# Patient Record
Sex: FEMALE | Race: WHITE | NOT HISPANIC OR LATINO | URBAN - METROPOLITAN AREA
[De-identification: names, ages, dates, MRNs, and addresses within clinical notes are randomized per-mention and may not be internally consistent; named-entity substitution may affect disease eponyms.]

---

## 2023-01-01 ENCOUNTER — NURSING HOME VISIT (OUTPATIENT)
Dept: POST ACUTE CARE | Facility: EXTERNAL LOCATION | Age: 88
End: 2023-01-01
Payer: MEDICARE

## 2023-01-01 DIAGNOSIS — L89.150 PRESSURE ULCER OF COCCYGEAL REGION, UNSTAGEABLE (MULTI): Primary | ICD-10-CM

## 2023-01-01 DIAGNOSIS — J44.9 CHRONIC OBSTRUCTIVE PULMONARY DISEASE, UNSPECIFIED COPD TYPE (MULTI): ICD-10-CM

## 2023-01-01 DIAGNOSIS — J96.10 CHRONIC RESPIRATORY FAILURE, UNSPECIFIED WHETHER WITH HYPOXIA OR HYPERCAPNIA (MULTI): ICD-10-CM

## 2023-01-01 DIAGNOSIS — I10 HYPERTENSION, ESSENTIAL: ICD-10-CM

## 2023-01-01 DIAGNOSIS — I50.9 CHRONIC HEART FAILURE, UNSPECIFIED HEART FAILURE TYPE (MULTI): ICD-10-CM

## 2023-01-01 DIAGNOSIS — F41.9 ANXIETY: ICD-10-CM

## 2023-01-01 DIAGNOSIS — I50.32 CHRONIC DIASTOLIC HEART FAILURE (MULTI): ICD-10-CM

## 2023-01-01 DIAGNOSIS — I50.9 CHRONIC HEART FAILURE, UNSPECIFIED HEART FAILURE TYPE (MULTI): Primary | ICD-10-CM

## 2023-01-01 DIAGNOSIS — R12 HEARTBURN: ICD-10-CM

## 2023-01-01 DIAGNOSIS — J96.21 ACUTE ON CHRONIC RESPIRATORY FAILURE WITH HYPOXIA AND HYPERCAPNIA (MULTI): ICD-10-CM

## 2023-01-01 DIAGNOSIS — J18.9 PNEUMONIA DUE TO INFECTIOUS ORGANISM, UNSPECIFIED LATERALITY, UNSPECIFIED PART OF LUNG: ICD-10-CM

## 2023-01-01 DIAGNOSIS — R00.0 TACHYCARDIA: ICD-10-CM

## 2023-01-01 DIAGNOSIS — J96.22 ACUTE ON CHRONIC RESPIRATORY FAILURE WITH HYPOXIA AND HYPERCAPNIA (MULTI): Primary | ICD-10-CM

## 2023-01-01 DIAGNOSIS — R53.1 WEAKNESS: ICD-10-CM

## 2023-01-01 DIAGNOSIS — J90 PLEURAL EFFUSION: ICD-10-CM

## 2023-01-01 DIAGNOSIS — J96.21 ACUTE ON CHRONIC RESPIRATORY FAILURE WITH HYPOXIA AND HYPERCAPNIA (MULTI): Primary | ICD-10-CM

## 2023-01-01 DIAGNOSIS — R53.83 LETHARGY: Primary | ICD-10-CM

## 2023-01-01 DIAGNOSIS — M79.605 PAIN IN BOTH LOWER EXTREMITIES: Primary | ICD-10-CM

## 2023-01-01 DIAGNOSIS — J18.9 PNEUMONIA DUE TO INFECTIOUS ORGANISM, UNSPECIFIED LATERALITY, UNSPECIFIED PART OF LUNG: Primary | ICD-10-CM

## 2023-01-01 DIAGNOSIS — R06.2 WHEEZING: ICD-10-CM

## 2023-01-01 DIAGNOSIS — L89.150 PRESSURE ULCER OF COCCYGEAL REGION, UNSTAGEABLE (MULTI): ICD-10-CM

## 2023-01-01 DIAGNOSIS — M79.604 PAIN IN BOTH LOWER EXTREMITIES: Primary | ICD-10-CM

## 2023-01-01 DIAGNOSIS — J96.22 ACUTE ON CHRONIC RESPIRATORY FAILURE WITH HYPOXIA AND HYPERCAPNIA (MULTI): ICD-10-CM

## 2023-01-01 LAB
ANION GAP IN SER/PLAS: 16 MMOL/L (ref 10–20)
BASOPHILS (10*3/UL) IN BLOOD BY AUTOMATED COUNT: 0.02 X10E9/L (ref 0–0.1)
BASOPHILS/100 LEUKOCYTES IN BLOOD BY AUTOMATED COUNT: 0.2 % (ref 0–2)
CALCIUM (MG/DL) IN SER/PLAS: 8.3 MG/DL (ref 8.6–10.3)
CARBON DIOXIDE, TOTAL (MMOL/L) IN SER/PLAS: 26 MMOL/L (ref 21–32)
CHLORIDE (MMOL/L) IN SER/PLAS: 95 MMOL/L (ref 98–107)
CREATININE (MG/DL) IN SER/PLAS: 1.05 MG/DL (ref 0.5–1.05)
ERYTHROCYTE DISTRIBUTION WIDTH (RATIO) BY AUTOMATED COUNT: 16 % (ref 11.5–14.5)
ERYTHROCYTE MEAN CORPUSCULAR HEMOGLOBIN CONCENTRATION (G/DL) BY AUTOMATED: 29.6 G/DL (ref 32–36)
ERYTHROCYTE MEAN CORPUSCULAR VOLUME (FL) BY AUTOMATED COUNT: 87 FL (ref 80–100)
ERYTHROCYTES (10*6/UL) IN BLOOD BY AUTOMATED COUNT: 2.87 X10E12/L (ref 4–5.2)
GFR FEMALE: 47 ML/MIN/1.73M2
GLUCOSE (MG/DL) IN SER/PLAS: 105 MG/DL (ref 74–99)
HEMATOCRIT (%) IN BLOOD BY AUTOMATED COUNT: 25 % (ref 36–46)
HEMOGLOBIN (G/DL) IN BLOOD: 7.4 G/DL (ref 12–16)
IMMATURE GRANULOCYTES/100 LEUKOCYTES IN BLOOD BY AUTOMATED COUNT: 0.5 % (ref 0–0.9)
LEUKOCYTES (10*3/UL) IN BLOOD BY AUTOMATED COUNT: 10 X10E9/L (ref 4.4–11.3)
LYMPHOCYTES (10*3/UL) IN BLOOD BY AUTOMATED COUNT: 0.56 X10E9/L (ref 0.8–3)
LYMPHOCYTES/100 LEUKOCYTES IN BLOOD BY AUTOMATED COUNT: 5.6 % (ref 13–44)
MONOCYTES (10*3/UL) IN BLOOD BY AUTOMATED COUNT: 0.42 X10E9/L (ref 0.05–0.8)
MONOCYTES/100 LEUKOCYTES IN BLOOD BY AUTOMATED COUNT: 4.2 % (ref 2–10)
NEUTROPHILS (10*3/UL) IN BLOOD BY AUTOMATED COUNT: 8.91 X10E9/L (ref 1.6–5.5)
NEUTROPHILS/100 LEUKOCYTES IN BLOOD BY AUTOMATED COUNT: 89.5 % (ref 40–80)
PLATELETS (10*3/UL) IN BLOOD AUTOMATED COUNT: 332 X10E9/L (ref 150–450)
POTASSIUM (MMOL/L) IN SER/PLAS: 4.4 MMOL/L (ref 3.5–5.3)
SODIUM (MMOL/L) IN SER/PLAS: 133 MMOL/L (ref 136–145)
UREA NITROGEN (MG/DL) IN SER/PLAS: 27 MG/DL (ref 6–23)

## 2023-01-01 PROCEDURE — 99308 SBSQ NF CARE LOW MDM 20: CPT | Performed by: INTERNAL MEDICINE

## 2023-01-01 PROCEDURE — 99309 SBSQ NF CARE MODERATE MDM 30: CPT | Performed by: NURSE PRACTITIONER

## 2023-01-01 PROCEDURE — 99305 1ST NF CARE MODERATE MDM 35: CPT | Performed by: INTERNAL MEDICINE

## 2023-01-01 PROCEDURE — 99306 1ST NF CARE HIGH MDM 50: CPT | Performed by: INTERNAL MEDICINE

## 2023-01-01 PROCEDURE — 11042 DBRDMT SUBQ TIS 1ST 20SQCM/<: CPT | Performed by: NURSE PRACTITIONER

## 2023-01-01 PROCEDURE — 99309 SBSQ NF CARE MODERATE MDM 30: CPT | Performed by: INTERNAL MEDICINE

## 2023-01-01 PROCEDURE — 99306 1ST NF CARE HIGH MDM 50: CPT | Performed by: NURSE PRACTITIONER

## 2023-01-01 PROCEDURE — 99308 SBSQ NF CARE LOW MDM 20: CPT | Performed by: NURSE PRACTITIONER

## 2023-03-23 PROBLEM — J96.10 CHRONIC RESPIRATORY FAILURE (MULTI): Status: ACTIVE | Noted: 2023-01-01

## 2023-03-23 PROBLEM — I10 HYPERTENSION, ESSENTIAL: Status: ACTIVE | Noted: 2023-01-01

## 2023-03-23 PROBLEM — I50.9 CHRONIC HEART FAILURE (MULTI): Status: ACTIVE | Noted: 2023-01-01

## 2023-03-23 PROBLEM — J44.9 COPD (CHRONIC OBSTRUCTIVE PULMONARY DISEASE) (MULTI): Status: ACTIVE | Noted: 2023-01-01

## 2023-03-23 PROBLEM — R53.1 WEAKNESS: Status: ACTIVE | Noted: 2023-01-01

## 2023-03-23 NOTE — LETTER
Patient: Cindy Nova  : 1922    Encounter Date: 2023    History and Physical  Subjective  Chief complaint: Cindy Nova is a 89 y.o. female who is a long term resident patient being seen and evaluated for multiple medical problems.  Patient presents for general medical care and to establish care.    HPI:  Patient was admitted to  from home d/t need for assist with care.  Patient requires assist with ADLs.  She is on O2 3L at baseline d/t hx CHF and COPD .  BP controlled on current medication.    Patient denies pain n/v/f/c.        Past Medical History:   Diagnosis Date   • Anemia    • Anxiety    • Chronic heart failure (CMS/HCC)    • Chronic respiratory failure (CMS/HCC)    • COPD (chronic obstructive pulmonary disease) (CMS/HCC)    • Hypertension, essential    • Other allergy status, other than to drugs and biological substances     History of environmental allergies   • Personal history of other diseases of the circulatory system     History of hypertension   • Personal history of other diseases of the circulatory system     History of atrial fibrillation   • Personal history of peptic ulcer disease 03/10/2015    Personal history of gastric ulcer   • Polyneuropathy    • Primary pulmonary HTN (CMS/HCC)        Past Surgical History:   Procedure Laterality Date   • CATARACT EXTRACTION  2014    Cataract Surgery   • TOTAL ABDOMINAL HYSTERECTOMY  2014    Total Abdominal Hysterectomy       Family History   Problem Relation Name Age of Onset   • No Known Problems Mother     • No Known Problems Father         Social History     Socioeconomic History   • Marital status:      Spouse name: Not on file   • Number of children: Not on file   • Years of education: Not on file   • Highest education level: Not on file   Occupational History   • Not on file   Tobacco Use   • Smoking status: Not on file   • Smokeless tobacco: Not on file   Vaping Use   • Vaping status: Not on file    Substance and Sexual Activity   • Alcohol use: Not on file   • Drug use: Not on file   • Sexual activity: Not on file   Other Topics Concern   • Not on file   Social History Narrative   • Not on file     Social Determinants of Health     Financial Resource Strain: Not on file   Food Insecurity: Not on file   Transportation Needs: Not on file   Physical Activity: Not on file   Stress: Not on file   Social Connections: Not on file   Intimate Partner Violence: Not on file   Housing Stability: Not on file       Vital signs: 137/63, 98.0, 96, 18, 98%    Objective  Physical Exam  Constitutional:       General: She is not in acute distress.  Eyes:      Extraocular Movements: Extraocular movements intact.   Cardiovascular:      Rate and Rhythm: Regular rhythm.   Pulmonary:      Effort: Pulmonary effort is normal.      Breath sounds: Normal breath sounds.      Comments: O2  Abdominal:      General: Bowel sounds are normal.      Palpations: Abdomen is soft.   Musculoskeletal:      Cervical back: Neck supple.      Right lower leg: No edema.      Left lower leg: No edema.      Comments: Generalized weakness   Neurological:      Mental Status: She is alert.   Psychiatric:         Mood and Affect: Mood normal.         Behavior: Behavior is cooperative.         Assessment/Plan  Problem List Items Addressed This Visit       Chronic heart failure (CMS/HCC) - Primary     Stable  No edema  Monitor weight         Chronic respiratory failure (CMS/HCC)     Continue home O2          COPD (chronic obstructive pulmonary disease) (CMS/HCC)     Continue inhalers  O2         Hypertension, essential     Controlled  Continue antihypertensives  Monitor blood pressure         Weakness     Medications, treatments, and labs reviewed  Continue medications and treatments as listed in [facility]    CAROLE Mendoza      Electronically Signed By: CAROLE Mendoza   3/23/23  8:01 PM

## 2023-03-23 NOTE — PROGRESS NOTES
History and Physical  Subjective   Chief complaint: Cindy Nova is a 89 y.o. female who is a long term resident patient being seen and evaluated for multiple medical problems.  Patient presents for general medical care and to establish care.    HPI:  Patient was admitted to  from home d/t need for assist with care.  Patient requires assist with ADLs.  She is on O2 3L at baseline d/t hx CHF and COPD .  BP controlled on current medication.    Patient denies pain n/v/f/c.        Past Medical History:   Diagnosis Date    Anemia     Anxiety     Chronic heart failure (CMS/HCC)     Chronic respiratory failure (CMS/HCC)     COPD (chronic obstructive pulmonary disease) (CMS/[facility])     Hypertension, essential     Other allergy status, other than to drugs and biological substances     History of environmental allergies    Personal history of other diseases of the circulatory system     History of hypertension    Personal history of other diseases of the circulatory system     History of atrial fibrillation    Personal history of peptic ulcer disease 03/10/2015    Personal history of gastric ulcer    Polyneuropathy     Primary pulmonary HTN (CMS/[facility])        Past Surgical History:   Procedure Laterality Date    CATARACT EXTRACTION  11/13/2014    Cataract Surgery    TOTAL ABDOMINAL HYSTERECTOMY  11/13/2014    Total Abdominal Hysterectomy       Family History   Problem Relation Name Age of Onset    No Known Problems Mother      No Known Problems Father         Social History     Socioeconomic History    Marital status:      Spouse name: Not on file    Number of children: Not on file    Years of education: Not on file    Highest education level: Not on file   Occupational History    Not on file   Tobacco Use    Smoking status: Not on file    Smokeless tobacco: Not on file   Vaping Use    Vaping status: Not on file   Substance and Sexual Activity    Alcohol use: Not on file    Drug use: Not on file    Sexual activity:  Not on file   Other Topics Concern    Not on file   Social History Narrative    Not on file     Social Determinants of Health     Financial Resource Strain: Not on file   Food Insecurity: Not on file   Transportation Needs: Not on file   Physical Activity: Not on file   Stress: Not on file   Social Connections: Not on file   Intimate Partner Violence: Not on file   Housing Stability: Not on file       Vital signs: 137/63, 98.0, 96, 18, 98%    Objective   Physical Exam  Constitutional:       General: She is not in acute distress.  Eyes:      Extraocular Movements: Extraocular movements intact.   Cardiovascular:      Rate and Rhythm: Regular rhythm.   Pulmonary:      Effort: Pulmonary effort is normal.      Breath sounds: Normal breath sounds.      Comments: O2  Abdominal:      General: Bowel sounds are normal.      Palpations: Abdomen is soft.   Musculoskeletal:      Cervical back: Neck supple.      Right lower leg: No edema.      Left lower leg: No edema.      Comments: Generalized weakness   Neurological:      Mental Status: She is alert.   Psychiatric:         Mood and Affect: Mood normal.         Behavior: Behavior is cooperative.         Assessment/Plan   Problem List Items Addressed This Visit       Chronic heart failure (CMS/HCC) - Primary     Stable  No edema  Monitor weight         Chronic respiratory failure (CMS/HCC)     Continue home O2          COPD (chronic obstructive pulmonary disease) (CMS/HCC)     Continue inhalers  O2         Hypertension, essential     Controlled  Continue antihypertensives  Monitor blood pressure         Weakness     Medications, treatments, and labs reviewed  Continue medications and treatments as listed in [facility]    MIGDALIA Mendoza-CNP

## 2023-03-24 PROBLEM — R06.2 WHEEZING: Status: ACTIVE | Noted: 2023-01-01

## 2023-03-24 NOTE — LETTER
Patient: Cindy Nova  : 1922    Encounter Date: 2023    Progress Note  Subjective  Chief complaint: Cindy Nova is a 89 y.o. female who is a long term resident patient being seen and evaluated for general medical care and follow up, SOB and wheezing    HPI:  Patient recently  admitted to nursing facility  from home d/t need for assist with care.  Patient requires assist with ADLs.  Patient requires supplemental O2 at baseline due to diagnosis of COPD and CHF. Patient presently presents with complaints of  SOB and audible.  She denies cough or congestion.  She is pleasantly confused and has no other concerns today.       Objective  Vital signs: 124/74, 68, 18, 96%    Physical Exam  Constitutional:       General: She is not in acute distress.  Eyes:      Extraocular Movements: Extraocular movements intact.   Cardiovascular:      Rate and Rhythm: Regular rhythm.   Pulmonary:      Effort: Pulmonary effort is normal.      Breath sounds: Examination of the right-upper field reveals wheezing. Examination of the left-upper field reveals wheezing. Wheezing present.   Abdominal:      General: Bowel sounds are normal.      Palpations: Abdomen is soft.   Musculoskeletal:      Cervical back: Neck supple.      Right lower leg: No edema.      Left lower leg: No edema.   Neurological:      Mental Status: She is alert.   Psychiatric:         Mood and Affect: Mood normal.         Behavior: Behavior is cooperative.         Assessment/Plan  Problem List Items Addressed This Visit          Respiratory    COPD (chronic obstructive pulmonary disease) (CMS/HCC)     Continue inhalers  O2         Wheezing     + SOB  Start Duoneb aerosol Treatment TID x 3 days then continue PRN TID            Circulatory    Chronic heart failure (CMS/HCC)     Stable  No edema  Monitor weight          Medications, treatments, and labs reviewed  Continue medications and treatments as listed in [facility]    Amy Heath,  MD      Electronically Signed By: Amy Heath MD   3/25/23 10:52 AM

## 2023-03-24 NOTE — PROGRESS NOTES
Progress Note  Subjective   Chief complaint: Cindy Nova is a 89 y.o. female who is a long term resident patient being seen and evaluated for general medical care and follow up, SOB and wheezing    HPI:  Patient recently  admitted to nursing facility  from home d/t need for assist with care.  Patient requires assist with ADLs.  Patient requires supplemental O2 at baseline due to diagnosis of COPD and CHF. Patient presently presents with complaints of  SOB and audible.  She denies cough or congestion.  She is pleasantly confused and has no other concerns today.       Objective   Vital signs: 124/74, 68, 18, 96%    Physical Exam  Constitutional:       General: She is not in acute distress.  Eyes:      Extraocular Movements: Extraocular movements intact.   Cardiovascular:      Rate and Rhythm: Regular rhythm.   Pulmonary:      Effort: Pulmonary effort is normal.      Breath sounds: Examination of the right-upper field reveals wheezing. Examination of the left-upper field reveals wheezing. Wheezing present.   Abdominal:      General: Bowel sounds are normal.      Palpations: Abdomen is soft.   Musculoskeletal:      Cervical back: Neck supple.      Right lower leg: No edema.      Left lower leg: No edema.   Neurological:      Mental Status: She is alert.   Psychiatric:         Mood and Affect: Mood normal.         Behavior: Behavior is cooperative.         Assessment/Plan   Problem List Items Addressed This Visit          Respiratory    COPD (chronic obstructive pulmonary disease) (CMS/HCC)     Continue inhalers  O2         Wheezing     + SOB  Start Duoneb aerosol Treatment TID x 3 days then continue PRN TID            Circulatory    Chronic heart failure (CMS/HCC)     Stable  No edema  Monitor weight          Medications, treatments, and labs reviewed  Continue medications and treatments as listed in [facility]    Amy Heath MD

## 2023-03-29 NOTE — LETTER
Patient: Cindy Nova  : 1922    Encounter Date: 2023    Subjective  Chief complaint: Cindy Nova is a 89 y.o. female who is a long term resident patient being seen and evaluated for anxiety, heartburn    HPI:  Patient recently started on Xanax due to extreme agitation and anxiousness.  In addition patient had complaints of heartburn.  Denies stomach pain, nausea or vomiting.  No acute distress or further concerns today.        Review of Systems  All systems reviewed and negative except for what was mentioned in the HPI    Vital signs: 137/63, 96, 18, 96%    Objective  Physical Exam  Constitutional:       General: She is not in acute distress.  Eyes:      Extraocular Movements: Extraocular movements intact.   Cardiovascular:      Rate and Rhythm: Regular rhythm.   Pulmonary:      Effort: Pulmonary effort is normal.      Breath sounds: Normal breath sounds.   Abdominal:      General: Bowel sounds are normal.      Palpations: Abdomen is soft.   Musculoskeletal:      Cervical back: Neck supple.      Right lower leg: No edema.      Left lower leg: No edema.   Neurological:      Mental Status: She is alert.   Psychiatric:         Mood and Affect: Mood normal.         Behavior: Behavior is cooperative.         Assessment/Plan  Problem List Items Addressed This Visit          Digestive    Heartburn     Maalox PRN            Other    Anxiety     Continue Xanax          Medications, treatments, and labs reviewed  Continue medications and treatments as listed in [facility]    Scribe Attestation  By signing my name below, IDelicia Scribe   attest that this documentation has been prepared under the direction and in the presence of Amy Heath MD.    Provider Attestation - Scribe documentation  All medical record entries made by the Scribe were at my direction and personally dictated by me. I have reviewed the chart and agree that the record accurately reflects my personal performance of the  history, physical exam, discussion and plan.      Electronically Signed By: Amy Heath MD   3/30/23  6:02 PM

## 2023-03-30 PROBLEM — R12 HEARTBURN: Status: ACTIVE | Noted: 2023-01-01

## 2023-03-30 PROBLEM — F41.9 ANXIETY: Status: ACTIVE | Noted: 2023-01-01

## 2023-03-30 NOTE — PROGRESS NOTES
Subjective   Chief complaint: Cindy Nova is a 89 y.o. female who is a long term resident patient being seen and evaluated for anxiety, heartburn    HPI:  Patient recently started on Xanax due to extreme agitation and anxiousness.  In addition patient had complaints of heartburn.  Denies stomach pain, nausea or vomiting.  No acute distress or further concerns today.        Review of Systems  All systems reviewed and negative except for what was mentioned in the HPI    Vital signs: 137/63, 96, 18, 96%    Objective   Physical Exam  Constitutional:       General: She is not in acute distress.  Eyes:      Extraocular Movements: Extraocular movements intact.   Cardiovascular:      Rate and Rhythm: Regular rhythm.   Pulmonary:      Effort: Pulmonary effort is normal.      Breath sounds: Normal breath sounds.   Abdominal:      General: Bowel sounds are normal.      Palpations: Abdomen is soft.   Musculoskeletal:      Cervical back: Neck supple.      Right lower leg: No edema.      Left lower leg: No edema.   Neurological:      Mental Status: She is alert.   Psychiatric:         Mood and Affect: Mood normal.         Behavior: Behavior is cooperative.         Assessment/Plan   Problem List Items Addressed This Visit          Digestive    Heartburn     Maalox PRN            Other    Anxiety     Continue Xanax          Medications, treatments, and labs reviewed  Continue medications and treatments as listed in [facility]    Scribe Attestation  By signing my name below, IDelicia Scribe   attest that this documentation has been prepared under the direction and in the presence of Amy Heath MD.    Provider Attestation - Scribe documentation  All medical record entries made by the Scribe were at my direction and personally dictated by me. I have reviewed the chart and agree that the record accurately reflects my personal performance of the history, physical exam, discussion and plan.

## 2023-03-31 NOTE — LETTER
Patient: Cindy Nova  : 1922    Encounter Date: 2023    Subjective  Chief complaint: Cindy Nova is a 89 y.o. female who is a long term resident patient being seen and evaluated for low pulse Ox, dizziness    HPI:  Nurse called and reported patient with complaints of feeling dizzy and weak. Upon exam she was found to have low pulse Ox and was having complaints of shortness of  breath as well. No fever or chills noted on exam. CXR ordered Xanax and Metoprolol decreased and administration time of Gabapentin was changed to HS. XR results showed infiltrates. No other issues at this time.        Review of Systems  All systems reviewed and negative except for what was mentioned in the HPI    Vital signs: 145/80, 95, 18, 95%    Objective  Physical Exam  Constitutional:       General: She is not in acute distress.  Eyes:      Extraocular Movements: Extraocular movements intact.   Cardiovascular:      Rate and Rhythm: Regular rhythm.   Pulmonary:      Effort: Pulmonary effort is normal.      Breath sounds: Normal breath sounds.   Abdominal:      General: Bowel sounds are normal.      Palpations: Abdomen is soft.   Musculoskeletal:      Cervical back: Neck supple.      Right lower leg: No edema.      Left lower leg: No edema.   Neurological:      Mental Status: She is alert.   Psychiatric:         Mood and Affect: Mood normal.         Behavior: Behavior is cooperative.         Assessment/Plan  Problem List Items Addressed This Visit          Respiratory    COPD (chronic obstructive pulmonary disease) (CMS/[facility])     Continue inhalers  O2         Pneumonia due to infectious organism     Start Augmentin  Prednisone taper  O2  Inhalers  Aerosol tx          Medications, treatments, and labs reviewed  Continue medications and treatments as listed in [facility]    Scribe Attestation  By signing my name below, I, Delicia Son, Scribe   attest that this documentation has been prepared under the direction and in the  presence of Amy Heath MD.    Provider Attestation - Scribe documentation  All medical record entries made by the Scribe were at my direction and personally dictated by me. I have reviewed the chart and agree that the record accurately reflects my personal performance of the history, physical exam, discussion and plan.      Electronically Signed By: Amy Heath MD   4/3/23  6:25 PM

## 2023-04-03 PROBLEM — J18.9 PNEUMONIA DUE TO INFECTIOUS ORGANISM: Status: ACTIVE | Noted: 2023-01-01

## 2023-04-03 NOTE — PROGRESS NOTES
Subjective   Chief complaint: Cindy Nova is a 89 y.o. female who is a long term resident patient being seen and evaluated for low pulse Ox, dizziness    HPI:  Nurse called and reported patient with complaints of feeling dizzy and weak. Upon exam she was found to have low pulse Ox and was having complaints of shortness of  breath as well. No fever or chills noted on exam. CXR ordered Xanax and Metoprolol decreased and administration time of Gabapentin was changed to HS. XR results showed infiltrates. No other issues at this time.        Review of Systems  All systems reviewed and negative except for what was mentioned in the HPI    Vital signs: 145/80, 95, 18, 95%    Objective   Physical Exam  Constitutional:       General: She is not in acute distress.  Eyes:      Extraocular Movements: Extraocular movements intact.   Cardiovascular:      Rate and Rhythm: Regular rhythm.   Pulmonary:      Effort: Pulmonary effort is normal.      Breath sounds: Normal breath sounds.   Abdominal:      General: Bowel sounds are normal.      Palpations: Abdomen is soft.   Musculoskeletal:      Cervical back: Neck supple.      Right lower leg: No edema.      Left lower leg: No edema.   Neurological:      Mental Status: She is alert.   Psychiatric:         Mood and Affect: Mood normal.         Behavior: Behavior is cooperative.         Assessment/Plan   Problem List Items Addressed This Visit          Respiratory    COPD (chronic obstructive pulmonary disease) (CMS/[facility])     Continue inhalers  O2         Pneumonia due to infectious organism     Start Augmentin  Prednisone taper  O2  Inhalers  Aerosol tx          Medications, treatments, and labs reviewed  Continue medications and treatments as listed in [facility]    Scribe Attestation  By signing my name below, Delicia SANTO, Elsaibkira   attest that this documentation has been prepared under the direction and in the presence of Amy Heath MD.    Provider Attestation - Scribe  documentation  All medical record entries made by the Scribe were at my direction and personally dictated by me. I have reviewed the chart and agree that the record accurately reflects my personal performance of the history, physical exam, discussion and plan.

## 2023-04-04 NOTE — LETTER
Patient: Cindy Nova  : 1922    Encounter Date: 2023    PROGRESS NOTE    Subjective  Chief complaint: Cindy Nova is a 89 y.o. female who is a long term care patient being seen and evaluated for anxiety and fu pna.    HPI:  Patient remains on atb for pna.  She denies sob f/c.  Nurse reporting patient with frequent episodes of anxiety which are intermittent throughout the day, occur daily, and have been present upon admission.        Objective  Physical Exam  Constitutional:       General: She is not in acute distress.  Eyes:      Extraocular Movements: Extraocular movements intact.   Cardiovascular:      Rate and Rhythm: Regular rhythm.   Pulmonary:      Effort: Pulmonary effort is normal.      Breath sounds: Normal breath sounds.      Comments: O2  Abdominal:      General: Bowel sounds are normal.      Palpations: Abdomen is soft.   Musculoskeletal:      Cervical back: Neck supple.      Right lower leg: No edema.      Left lower leg: No edema.      Comments: Generalized weakness   Neurological:      Mental Status: She is alert.   Psychiatric:         Behavior: Behavior is cooperative.         Assessment/Plan  Problem List Items Addressed This Visit       Anxiety     Start buspar         Chronic heart failure (CMS/HCC)     Stable  No edema  Monitor weight         Chronic respiratory failure (CMS/HCC)     Continue home O2          COPD (chronic obstructive pulmonary disease) (CMS/HCC)     Continue inhalers  O2         Hypertension, essential     Continue antihypertensives  Monitor blood pressure         Pneumonia due to infectious organism - Primary     Continue atb until complete          Medications, treatments, and labs reviewed  Continue medications and treatments as listed in [facility]    CAROLE Mendoza      Electronically Signed By: CAROLE Mendoza   23  5:57 PM   [Well Developed] : well developed [Well Nourished] : well nourished

## 2023-04-05 NOTE — PROGRESS NOTES
PROGRESS NOTE    Subjective   Chief complaint: Cindy Nova is a 89 y.o. female who is a long term care patient being seen and evaluated for anxiety and fu pna.    HPI:  Patient remains on atb for pna.  She denies sob f/c.  Nurse reporting patient with frequent episodes of anxiety which are intermittent throughout the day, occur daily, and have been present upon admission.        Objective   Physical Exam  Constitutional:       General: She is not in acute distress.  Eyes:      Extraocular Movements: Extraocular movements intact.   Cardiovascular:      Rate and Rhythm: Regular rhythm.   Pulmonary:      Effort: Pulmonary effort is normal.      Breath sounds: Normal breath sounds.      Comments: O2  Abdominal:      General: Bowel sounds are normal.      Palpations: Abdomen is soft.   Musculoskeletal:      Cervical back: Neck supple.      Right lower leg: No edema.      Left lower leg: No edema.      Comments: Generalized weakness   Neurological:      Mental Status: She is alert.   Psychiatric:         Behavior: Behavior is cooperative.         Assessment/Plan   Problem List Items Addressed This Visit       Anxiety     Start buspar         Chronic heart failure (CMS/HCC)     Stable  No edema  Monitor weight         Chronic respiratory failure (CMS/HCC)     Continue home O2          COPD (chronic obstructive pulmonary disease) (CMS/[facility])     Continue inhalers  O2         Hypertension, essential     Continue antihypertensives  Monitor blood pressure         Pneumonia due to infectious organism - Primary     Continue atb until complete          Medications, treatments, and labs reviewed  Continue medications and treatments as listed in [facility]    MIGDALIA Mendoza-CNP

## 2023-04-12 NOTE — LETTER
Patient: Cindy Nova  : 1922    Encounter Date: 2023    HISTORY & PHYSICAL    Subjective  Chief complaint: Cindy Nova is a 89 y.o. female who is a acute skilled care patient being seen and evaluated for multiple medical problems.  Patient presents for weakness.    HPI:  Patient is a 89 year old female with a PMH of HF, A Fib, COPD, HTN and pulmonary HTN. She was brought to the ED with increasing shortness of breath. Pt's daughter states she was taken off most of her medication except Metoprolol and ASA. CTA of chest showed a large right pleural effusion and moderate left pleural effusion. A CT of the abdomen also showed suspicion for liver lesions. Patient was treated for exacerbation of COPD and congestive heart failures she was given diuresis she was given oxygen aerosol treatment she improvedPatient was discharged to SNF for further care and therapy.         Past Medical History:   Diagnosis Date   • Anemia    • Anxiety    • Chronic heart failure (CMS/HCC)    • Chronic respiratory failure (CMS/HCC)    • COPD (chronic obstructive pulmonary disease) (CMS/HCC)    • Hypertension, essential    • Other allergy status, other than to drugs and biological substances     History of environmental allergies   • Personal history of other diseases of the circulatory system     History of hypertension   • Personal history of other diseases of the circulatory system     History of atrial fibrillation   • Personal history of peptic ulcer disease 03/10/2015    Personal history of gastric ulcer   • Polyneuropathy    • Primary pulmonary HTN (CMS/HCC)        Past Surgical History:   Procedure Laterality Date   • CATARACT EXTRACTION  2014    Cataract Surgery   • TOTAL ABDOMINAL HYSTERECTOMY  2014    Total Abdominal Hysterectomy       Family History   Problem Relation Name Age of Onset   • No Known Problems Mother     • No Known Problems Father         Social History     Socioeconomic History   •  Marital status:      Spouse name: Not on file   • Number of children: Not on file   • Years of education: Not on file   • Highest education level: Not on file   Occupational History   • Not on file   Tobacco Use   • Smoking status: Not on file   • Smokeless tobacco: Not on file   Vaping Use   • Vaping status: Not on file   Substance and Sexual Activity   • Alcohol use: Not on file   • Drug use: Not on file   • Sexual activity: Not on file   Other Topics Concern   • Not on file   Social History Narrative   • Not on file     Social Determinants of Health     Financial Resource Strain: Not on file   Food Insecurity: Not on file   Transportation Needs: Not on file   Physical Activity: Not on file   Stress: Not on file   Social Connections: Not on file   Intimate Partner Violence: Not on file   Housing Stability: Not on file       Vital signs: 136/62, 97.9, 91, 18, 94%    Objective  Physical Exam  Vitals reviewed.   Constitutional:       Appearance: Normal appearance.   HENT:      Head: Normocephalic and atraumatic.   Cardiovascular:      Rate and Rhythm: Normal rate and regular rhythm.   Pulmonary:      Effort: Pulmonary effort is normal.      Breath sounds: Normal breath sounds.   Abdominal:      General: Bowel sounds are normal.      Palpations: Abdomen is soft.   Musculoskeletal:      Cervical back: Neck supple.   Skin:     General: Skin is warm and dry.   Neurological:      General: No focal deficit present.      Mental Status: She is alert.   Psychiatric:         Mood and Affect: Mood normal.         Behavior: Behavior is cooperative.         Assessment/Plan  Problem List Items Addressed This Visit          Respiratory    Chronic respiratory failure (CMS/HCC)    COPD (chronic obstructive pulmonary disease) (CMS/HCC)       Circulatory    Hypertension, essential    Chronic heart failure (CMS/HCC) - Primary     Medications, treatments, and labs reviewed  Continue medications and treatments as listed in  [facility]    Scribe Attestation  I, Toña Limon   attest that this documentation has been prepared under the direction and in the presence of Amy Heath MD.    Provider Attestation - Scribe documentation  All medical record entries made by the Scribe were at my direction and personally dictated by me. I have reviewed the chart and agree that the record accurately reflects my personal performance of the history, physical exam, discussion and plan.    Amy Heath MD          Electronically Signed By: Amy Heath MD   4/12/23  4:29 PM

## 2023-04-12 NOTE — PROGRESS NOTES
HISTORY & PHYSICAL    Subjective   Chief complaint: Cindy Nova is a 89 y.o. female who is a acute skilled care patient being seen and evaluated for multiple medical problems.  Patient presents for weakness.    HPI:  Patient is a 89 year old female with a PMH of HF, A Fib, COPD, HTN and pulmonary HTN. She was brought to the ED with increasing shortness of breath. Pt's daughter states she was taken off most of her medication except Metoprolol and ASA. CTA of chest showed a large right pleural effusion and moderate left pleural effusion. A CT of the abdomen also showed suspicion for liver lesions. Patient was treated for exacerbation of COPD and congestive heart failures she was given diuresis she was given oxygen aerosol treatment she improvedPatient was discharged to SNF for further care and therapy.         Past Medical History:   Diagnosis Date    Anemia     Anxiety     Chronic heart failure (CMS/HCC)     Chronic respiratory failure (CMS/HCC)     COPD (chronic obstructive pulmonary disease) (CMS/HCC)     Hypertension, essential     Other allergy status, other than to drugs and biological substances     History of environmental allergies    Personal history of other diseases of the circulatory system     History of hypertension    Personal history of other diseases of the circulatory system     History of atrial fibrillation    Personal history of peptic ulcer disease 03/10/2015    Personal history of gastric ulcer    Polyneuropathy     Primary pulmonary HTN (CMS/HCC)        Past Surgical History:   Procedure Laterality Date    CATARACT EXTRACTION  11/13/2014    Cataract Surgery    TOTAL ABDOMINAL HYSTERECTOMY  11/13/2014    Total Abdominal Hysterectomy       Family History   Problem Relation Name Age of Onset    No Known Problems Mother      No Known Problems Father         Social History     Socioeconomic History    Marital status:      Spouse name: Not on file    Number of children: Not on file     Years of education: Not on file    Highest education level: Not on file   Occupational History    Not on file   Tobacco Use    Smoking status: Not on file    Smokeless tobacco: Not on file   Vaping Use    Vaping status: Not on file   Substance and Sexual Activity    Alcohol use: Not on file    Drug use: Not on file    Sexual activity: Not on file   Other Topics Concern    Not on file   Social History Narrative    Not on file     Social Determinants of Health     Financial Resource Strain: Not on file   Food Insecurity: Not on file   Transportation Needs: Not on file   Physical Activity: Not on file   Stress: Not on file   Social Connections: Not on file   Intimate Partner Violence: Not on file   Housing Stability: Not on file       Vital signs: 136/62, 97.9, 91, 18, 94%    Objective   Physical Exam  Vitals reviewed.   Constitutional:       Appearance: Normal appearance.   HENT:      Head: Normocephalic and atraumatic.   Cardiovascular:      Rate and Rhythm: Normal rate and regular rhythm.   Pulmonary:      Effort: Pulmonary effort is normal.      Breath sounds: Normal breath sounds.   Abdominal:      General: Bowel sounds are normal.      Palpations: Abdomen is soft.   Musculoskeletal:      Cervical back: Neck supple.   Skin:     General: Skin is warm and dry.   Neurological:      General: No focal deficit present.      Mental Status: She is alert.   Psychiatric:         Mood and Affect: Mood normal.         Behavior: Behavior is cooperative.         Assessment/Plan   Problem List Items Addressed This Visit          Respiratory    Chronic respiratory failure (CMS/HCC)    COPD (chronic obstructive pulmonary disease) (CMS/HCC)       Circulatory    Hypertension, essential    Chronic heart failure (CMS/HCC) - Primary     Medications, treatments, and labs reviewed  Continue medications and treatments as listed in [facility]    Scribe Attestation  I, Toña Limon   attest that this documentation has been prepared under  the direction and in the presence of Amy Heath MD.    Provider Attestation - Scribe documentation  All medical record entries made by the Scribe were at my direction and personally dictated by me. I have reviewed the chart and agree that the record accurately reflects my personal performance of the history, physical exam, discussion and plan.    Amy Heath MD

## 2023-04-14 NOTE — LETTER
Patient: Cindy Nova  : 1922    Encounter Date: 2023    PROGRESS NOTE    Subjective  Chief complaint: Cindy Nova is a 89 y.o. female who is a long term care patient being seen and evaluated for  CHF, COPD    HPI:   patient recently hospitalized due to CHF presents for follow-up.  She denies shortness of breath, orthopnea, unexplained weight gain, wheezing or cough.  She continues on supplemental oxygen and states that she is feeling well.  No acute distress.  No new concerns.      Objective  Vital signs:  127/76, 94%    Physical Exam  Constitutional:       General: She is not in acute distress.  Eyes:      Extraocular Movements: Extraocular movements intact.   Cardiovascular:      Rate and Rhythm: Normal rate and regular rhythm.   Pulmonary:      Effort: Pulmonary effort is normal.      Breath sounds: Normal breath sounds.   Abdominal:      General: Bowel sounds are normal.      Palpations: Abdomen is soft.   Musculoskeletal:         General: Normal range of motion.      Cervical back: Normal range of motion and neck supple.      Right lower leg: No edema.      Left lower leg: No edema.   Neurological:      Mental Status: She is alert.   Psychiatric:         Mood and Affect: Mood normal.         Behavior: Behavior is cooperative.         Assessment/Plan  Problem List Items Addressed This Visit          Respiratory    COPD (chronic obstructive pulmonary disease) (CMS/HCC)     Continue inhalers  O2            Circulatory    Chronic heart failure (CMS/HCC)     Stable  No edema  Monitor weight  Aerosol treatments as needed  Continue supplemental O2          Medications, treatments, and labs reviewed  Continue medications and treatments as listed in [facility]    Scribe Attestation  By signing my name below, Delicia SANTO, Elsaibkira   attest that this documentation has been prepared under the direction and in the presence of Amy Heath MD.    Provider Attestation - Scribe documentation  All  medical record entries made by the Scribe were at my direction and personally dictated by me. I have reviewed the chart and agree that the record accurately reflects my personal performance of the history, physical exam, discussion and plan.  1. Chronic heart failure, unspecified heart failure type (CMS/HCC)        2. Chronic obstructive pulmonary disease, unspecified COPD type (CMS/HCC)               Electronically Signed By: Amy Heath MD   4/19/23  6:09 PM

## 2023-04-19 NOTE — PROGRESS NOTES
PROGRESS NOTE    Subjective   Chief complaint: Cindy Nova is a 89 y.o. female who is a long term care patient being seen and evaluated for  CHF, COPD    HPI:   patient recently hospitalized due to CHF presents for follow-up.  She denies shortness of breath, orthopnea, unexplained weight gain, wheezing or cough.  She continues on supplemental oxygen and states that she is feeling well.  No acute distress.  No new concerns.      Objective   Vital signs:  127/76, 94%    Physical Exam  Constitutional:       General: She is not in acute distress.  Eyes:      Extraocular Movements: Extraocular movements intact.   Cardiovascular:      Rate and Rhythm: Normal rate and regular rhythm.   Pulmonary:      Effort: Pulmonary effort is normal.      Breath sounds: Normal breath sounds.   Abdominal:      General: Bowel sounds are normal.      Palpations: Abdomen is soft.   Musculoskeletal:         General: Normal range of motion.      Cervical back: Normal range of motion and neck supple.      Right lower leg: No edema.      Left lower leg: No edema.   Neurological:      Mental Status: She is alert.   Psychiatric:         Mood and Affect: Mood normal.         Behavior: Behavior is cooperative.         Assessment/Plan   Problem List Items Addressed This Visit          Respiratory    COPD (chronic obstructive pulmonary disease) (CMS/HCC)     Continue inhalers  O2            Circulatory    Chronic heart failure (CMS/HCC)     Stable  No edema  Monitor weight  Aerosol treatments as needed  Continue supplemental O2          Medications, treatments, and labs reviewed  Continue medications and treatments as listed in [facility]    Scribe Attestation  By signing my name below, Delicia SANTO Scribe   attest that this documentation has been prepared under the direction and in the presence of Amy Heath MD.    Provider Attestation - Scribe documentation  All medical record entries made by the Scribe were at my direction and personally  dictated by me. I have reviewed the chart and agree that the record accurately reflects my personal performance of the history, physical exam, discussion and plan.  1. Chronic heart failure, unspecified heart failure type (CMS/HCC)        2. Chronic obstructive pulmonary disease, unspecified COPD type (CMS/[facility])

## 2023-04-20 NOTE — LETTER
Patient: Cindy Nova  : 1922    Encounter Date: 2023    PROGRESS NOTE    Subjective  Chief complaint: Cindy Nova is a 89 y.o. female who is a acute skilled care patient being seen and evaluated for weakness.    HPI:  Patient readmitted from hospitalization for acute on chronic respiratory failure d/t worsening b/l pleural effusions.  She underwent thoracentesis.  Patient was readmitted with unstageable ulcer to coccyx present upon admission according to the nurse.  Patient is on O2.  Denies shortness of breath, nausea vomiting fever and chills at this time.      Objective  Vital signs:  110/68, 98.0, 96, 20, 97%  Physical Exam  Constitutional:       General: She is not in acute distress.  Eyes:      Extraocular Movements: Extraocular movements intact.   Pulmonary:      Effort: Pulmonary effort is normal.      Breath sounds: Normal breath sounds.   Abdominal:      General: Bowel sounds are normal.      Palpations: Abdomen is soft.   Musculoskeletal:      Cervical back: Neck supple.      Right lower leg: No edema.      Left lower leg: No edema.      Comments: Generalized weakness   Skin:     Comments: Wound to coccyx:  Etiology - pressure  Granulation - small  Slough - large  Edges - raised, periwound reddened and poorly blanching  Odor - no  Drainage - small serosanguinous  Size - 1.8 x 1.5 x utd cm   Neurological:      Mental Status: She is alert.   Psychiatric:         Mood and Affect: Mood normal.         Behavior: Behavior is cooperative.         Assessment/Plan  Problem List Items Addressed This Visit       Acute on chronic respiratory failure with hypoxia and hypercapnia (CMS/HCC) - Primary     Felt to be 2/2 pleural effusion per hospital record  S/p thoracentesis         Chronic diastolic heart failure (CMS/HCC)     Diuretic  Stable  No edema  Monitor weight         COPD (chronic obstructive pulmonary disease) (CMS/HCC)     Continue inhalers  O2         Hypertension, essential     BP  at goal  Continue antihypertensives  Monitor blood pressure         Pleural effusion     S/p thoracentesis         Pressure ulcer of coccygeal region, unstageable (CMS/HCC)     Cleanse with NS, pat dry, apply calcium alginate and silicon border foam dressing  Nutritional supplement  Air mattress  Offload pressure  Cushion to chair  Turn q2h when in bed          Medications, treatments, and labs reviewed  Continue medications and treatments as listed in [facility]    Scribe Attestation  ISharon Scribkira   attest that this documentation has been prepared under the direction and in the presence of CAROLE Mendoza    Provider Attestation - Scribe documentation  All medical record entries made by the Scribe were at my direction and personally dictated by me. I have reviewed the chart and agree that the record accurately reflects my personal performance of the history, physical exam, discussion and plan.   CAROLE Mendzoa        Electronically Signed By: CAROLE Mendoza   4/23/23  1:46 PM

## 2023-04-20 NOTE — PROGRESS NOTES
PROGRESS NOTE    Subjective   Chief complaint: Cindy Nova is a 89 y.o. female who is a acute skilled care patient being seen and evaluated for weakness.    HPI:  Patient readmitted from hospitalization for acute on chronic respiratory failure d/t worsening b/l pleural effusions.  She underwent thoracentesis.  Patient was readmitted with unstageable ulcer to coccyx present upon admission according to the nurse.  Patient is on O2.  Denies shortness of breath, nausea vomiting fever and chills at this time.      Objective   Vital signs:  110/68, 98.0, 96, 20, 97%  Physical Exam  Constitutional:       General: She is not in acute distress.  Eyes:      Extraocular Movements: Extraocular movements intact.   Pulmonary:      Effort: Pulmonary effort is normal.      Breath sounds: Normal breath sounds.   Abdominal:      General: Bowel sounds are normal.      Palpations: Abdomen is soft.   Musculoskeletal:      Cervical back: Neck supple.      Right lower leg: No edema.      Left lower leg: No edema.      Comments: Generalized weakness   Skin:     Comments: Wound to coccyx:  Etiology - pressure  Granulation - small  Slough - large  Edges - raised, periwound reddened and poorly blanching  Odor - no  Drainage - small serosanguinous  Size - 1.8 x 1.5 x utd cm   Neurological:      Mental Status: She is alert.   Psychiatric:         Mood and Affect: Mood normal.         Behavior: Behavior is cooperative.         Assessment/Plan   Problem List Items Addressed This Visit       Acute on chronic respiratory failure with hypoxia and hypercapnia (CMS/HCC) - Primary     Felt to be 2/2 pleural effusion per hospital record  S/p thoracentesis         Chronic diastolic heart failure (CMS/HCC)     Diuretic  Stable  No edema  Monitor weight         COPD (chronic obstructive pulmonary disease) (CMS/HCC)     Continue inhalers  O2         Hypertension, essential     BP at goal  Continue antihypertensives  Monitor blood pressure          Pleural effusion     S/p thoracentesis         Pressure ulcer of coccygeal region, unstageable (CMS/HCC)     Cleanse with NS, pat dry, apply calcium alginate and silicon border foam dressing  Nutritional supplement  Air mattress  Offload pressure  Cushion to chair  Turn q2h when in bed          Medications, treatments, and labs reviewed  Continue medications and treatments as listed in [facility]    Scribe Attestation  ISharon Scribe   attest that this documentation has been prepared under the direction and in the presence of CAROLE Mendoza    Provider Attestation - Scribe documentation  All medical record entries made by the Scribe were at my direction and personally dictated by me. I have reviewed the chart and agree that the record accurately reflects my personal performance of the history, physical exam, discussion and plan.   CAROLE Mendoza

## 2023-04-21 NOTE — PROGRESS NOTES
HISTORY & PHYSICAL    Subjective   Chief complaint: Cindy Nova is a 89 y.o. female who is a acute skilled care patient being seen and evaluated for multiple medical problems.  Patient presents for weakness.    HPI:  Patient is a 89 year old female who was admitted to the ED after being found on the floor in her SNF. Pt states that she was trying to get her call button and could not get a way to come and help her and she attempted to get out of bed and ended up on the floor. She believes it was 45 minutes before someone discovered her. EMS describes pulse ox of 75% on arrival but this is unclear whether she was on her usual 4 L home oxygen at the time.  Patient was evaluated in the hospitals by x-rays and scan all negative for any injuries she was treated for the COPD with oxygen aerosol treatment she improved pt's daughter was present and described her past four hospital stays this past month. Patient was stable and able to be discharged back to skilled nursing facility        Past Medical History:   Diagnosis Date    Acute respiratory failure with hypoxia (CMS/HCC)     Anemia     Anxiety     Chronic heart failure (CMS/HCC)     Chronic respiratory failure (CMS/HCC)     COPD (chronic obstructive pulmonary disease) (CMS/HCC)     Hypertension, essential     Other allergy status, other than to drugs and biological substances     History of environmental allergies    Personal history of other diseases of the circulatory system     History of hypertension    Personal history of other diseases of the circulatory system     History of atrial fibrillation    Personal history of peptic ulcer disease 03/10/2015    Personal history of gastric ulcer    Polyneuropathy     Primary pulmonary HTN (CMS/HCC)        Past Surgical History:   Procedure Laterality Date    CATARACT EXTRACTION  11/13/2014    Cataract Surgery    TOTAL ABDOMINAL HYSTERECTOMY  11/13/2014    Total Abdominal Hysterectomy       Family History   Problem  Relation Name Age of Onset    No Known Problems Mother      No Known Problems Father         Social History     Socioeconomic History    Marital status:      Spouse name: Not on file    Number of children: Not on file    Years of education: Not on file    Highest education level: Not on file   Occupational History    Not on file   Tobacco Use    Smoking status: Not on file    Smokeless tobacco: Not on file   Vaping Use    Vaping status: Not on file   Substance and Sexual Activity    Alcohol use: Not on file    Drug use: Not on file    Sexual activity: Not on file   Other Topics Concern    Not on file   Social History Narrative    Not on file     Social Determinants of Health     Financial Resource Strain: Not on file   Food Insecurity: Not on file   Transportation Needs: Not on file   Physical Activity: Not on file   Stress: Not on file   Social Connections: Not on file   Intimate Partner Violence: Not on file   Housing Stability: Not on file       Vital signs: 110/68, 98, 96, 20, 97%    Objective   Physical Exam  Vitals reviewed.   Constitutional:       Appearance: Normal appearance.   HENT:      Head: Normocephalic and atraumatic.   Cardiovascular:      Rate and Rhythm: Normal rate and regular rhythm.   Pulmonary:      Effort: Pulmonary effort is normal.      Breath sounds: Normal breath sounds.   Abdominal:      General: Bowel sounds are normal.      Palpations: Abdomen is soft.   Musculoskeletal:      Cervical back: Neck supple.   Skin:     General: Skin is warm and dry.   Neurological:      General: No focal deficit present.      Mental Status: She is alert.   Psychiatric:         Mood and Affect: Mood normal.         Behavior: Behavior is cooperative.         Assessment/Plan   Problem List Items Addressed This Visit          Respiratory    Chronic respiratory failure (CMS/HCC)    COPD (chronic obstructive pulmonary disease) (CMS/HCC)       Circulatory    Hypertension, essential    Chronic heart failure  (CMS/[facility]) - Primary       Other    Weakness     Medications, treatments, and labs reviewed  Continue medications and treatments as listed in [facility]    Scribe Attestation  I, Toña Limon   attest that this documentation has been prepared under the direction and in the presence of Amy Heath MD.    Provider Attestation - Scribe documentation  All medical record entries made by the Scribe were at my direction and personally dictated by me. I have reviewed the chart and agree that the record accurately reflects my personal performance of the history, physical exam, discussion and plan.    Amy Heath MD

## 2023-04-21 NOTE — LETTER
Patient: Cindy Nova  : 1922    Encounter Date: 2023    HISTORY & PHYSICAL    Subjective  Chief complaint: Cindy Nova is a 89 y.o. female who is a acute skilled care patient being seen and evaluated for multiple medical problems.  Patient presents for weakness.    HPI:  Patient is a 89 year old female who was admitted to the ED after being found on the floor in her SNF. Pt states that she was trying to get her call button and could not get a way to come and help her and she attempted to get out of bed and ended up on the floor. She believes it was 45 minutes before someone discovered her. EMS describes pulse ox of 75% on arrival but this is unclear whether she was on her usual 4 L home oxygen at the time.  Patient was evaluated in the hospitals by x-rays and scan all negative for any injuries she was treated for the COPD with oxygen aerosol treatment she improved pt's daughter was present and described her past four hospital stays this past month. Patient was stable and able to be discharged back to skilled nursing facility        Past Medical History:   Diagnosis Date   • Acute respiratory failure with hypoxia (CMS/HCC)    • Anemia    • Anxiety    • Chronic heart failure (CMS/HCC)    • Chronic respiratory failure (CMS/HCC)    • COPD (chronic obstructive pulmonary disease) (CMS/HCC)    • Hypertension, essential    • Other allergy status, other than to drugs and biological substances     History of environmental allergies   • Personal history of other diseases of the circulatory system     History of hypertension   • Personal history of other diseases of the circulatory system     History of atrial fibrillation   • Personal history of peptic ulcer disease 03/10/2015    Personal history of gastric ulcer   • Polyneuropathy    • Primary pulmonary HTN (CMS/HCC)        Past Surgical History:   Procedure Laterality Date   • CATARACT EXTRACTION  2014    Cataract Surgery   • TOTAL ABDOMINAL  HYSTERECTOMY  11/13/2014    Total Abdominal Hysterectomy       Family History   Problem Relation Name Age of Onset   • No Known Problems Mother     • No Known Problems Father         Social History     Socioeconomic History   • Marital status:      Spouse name: Not on file   • Number of children: Not on file   • Years of education: Not on file   • Highest education level: Not on file   Occupational History   • Not on file   Tobacco Use   • Smoking status: Not on file   • Smokeless tobacco: Not on file   Vaping Use   • Vaping status: Not on file   Substance and Sexual Activity   • Alcohol use: Not on file   • Drug use: Not on file   • Sexual activity: Not on file   Other Topics Concern   • Not on file   Social History Narrative   • Not on file     Social Determinants of Health     Financial Resource Strain: Not on file   Food Insecurity: Not on file   Transportation Needs: Not on file   Physical Activity: Not on file   Stress: Not on file   Social Connections: Not on file   Intimate Partner Violence: Not on file   Housing Stability: Not on file       Vital signs: 110/68, 98, 96, 20, 97%    Objective  Physical Exam  Vitals reviewed.   Constitutional:       Appearance: Normal appearance.   HENT:      Head: Normocephalic and atraumatic.   Cardiovascular:      Rate and Rhythm: Normal rate and regular rhythm.   Pulmonary:      Effort: Pulmonary effort is normal.      Breath sounds: Normal breath sounds.   Abdominal:      General: Bowel sounds are normal.      Palpations: Abdomen is soft.   Musculoskeletal:      Cervical back: Neck supple.   Skin:     General: Skin is warm and dry.   Neurological:      General: No focal deficit present.      Mental Status: She is alert.   Psychiatric:         Mood and Affect: Mood normal.         Behavior: Behavior is cooperative.         Assessment/Plan  Problem List Items Addressed This Visit          Respiratory    Chronic respiratory failure (CMS/[facility])    COPD (chronic  obstructive pulmonary disease) (CMS/[facility])       Circulatory    Hypertension, essential    Chronic heart failure (CMS/[facility]) - Primary       Other    Weakness     Medications, treatments, and labs reviewed  Continue medications and treatments as listed in PCC    Scribe Attestation  I, Toña Limon   attest that this documentation has been prepared under the direction and in the presence of Amy Heath MD.    Provider Attestation - Scribe documentation  All medical record entries made by the Scribe were at my direction and personally dictated by me. I have reviewed the chart and agree that the record accurately reflects my personal performance of the history, physical exam, discussion and plan.    Amy Heath MD          Electronically Signed By: Amy Heath MD   4/21/23  5:29 PM

## 2023-04-23 PROBLEM — L89.150: Status: ACTIVE | Noted: 2023-01-01

## 2023-04-23 PROBLEM — J96.21 ACUTE ON CHRONIC RESPIRATORY FAILURE WITH HYPOXIA AND HYPERCAPNIA (MULTI): Status: ACTIVE | Noted: 2023-01-01

## 2023-04-23 PROBLEM — J18.9 PNEUMONIA DUE TO INFECTIOUS ORGANISM: Status: RESOLVED | Noted: 2023-01-01 | Resolved: 2023-01-01

## 2023-04-23 PROBLEM — I50.32 CHRONIC DIASTOLIC HEART FAILURE (MULTI): Status: ACTIVE | Noted: 2023-01-01

## 2023-04-23 PROBLEM — R06.2 WHEEZING: Status: RESOLVED | Noted: 2023-01-01 | Resolved: 2023-01-01

## 2023-04-23 PROBLEM — J96.22 ACUTE ON CHRONIC RESPIRATORY FAILURE WITH HYPOXIA AND HYPERCAPNIA (MULTI): Status: ACTIVE | Noted: 2023-01-01

## 2023-04-23 PROBLEM — J90 PLEURAL EFFUSION: Status: ACTIVE | Noted: 2023-01-01

## 2023-04-23 NOTE — ASSESSMENT & PLAN NOTE
Cleanse with NS, pat dry, apply calcium alginate and silicon border foam dressing  Nutritional supplement  Air mattress  Offload pressure  Cushion to chair  Turn q2h when in bed

## 2023-04-25 NOTE — LETTER
Patient: Cindy Nova  : 1922    Encounter Date: 2023    PROGRESS NOTE    Subjective  Chief complaint: Cindy Nova is a 89 y.o. female who is a long term care patient being seen and evaluated for altered mental status    HPI:  Called to room by pulmonologist who is at bedside and states patient found in bed tachycardic and unresponsive this morning.  Family was called and initially wanted patient sent to the hospital however patient began to respond to verbal and tactile stimuli after a few minutes and stated that she did not want to go to the hospital.  Daughter elected to honor patient's wishes and transportation was canceled.  Patient is lethargic.  She denies nausea vomiting fever chills.      Objective  Vital signs: 125/79, 20, 97.6, 114, 95%    Physical Exam  Cardiovascular:      Rate and Rhythm: Tachycardia present. Rhythm irregular.   Pulmonary:      Effort: Pulmonary effort is normal.      Breath sounds: Decreased breath sounds present.      Comments: O2  Abdominal:      General: Bowel sounds are normal.      Palpations: Abdomen is soft.   Musculoskeletal:      Cervical back: Neck supple.      Right lower leg: No edema.      Left lower leg: No edema.      Comments: Generalized weakness   Neurological:      Mental Status: She is lethargic.   Psychiatric:         Behavior: Behavior is cooperative.         Assessment/Plan  Problem List Items Addressed This Visit       Chronic diastolic heart failure (CMS/HCC)     Diuretic  Stable  No edema  Monitor weight         Chronic respiratory failure (CMS/HCC)     Continue O2  Pulmonology following          COPD (chronic obstructive pulmonary disease) (CMS/HCC)     Continue inhalers  O2         Hypertension, essential     BP at goal  Continue antihypertensives  Monitor blood pressure         Lethargy - Primary     Decrease xanax  No hospital per patient and family         Tachycardia     Metoprolol   Monitor         Weakness     Medications,  treatments, and labs reviewed  Continue medications and treatments as listed in PCC    CAROLE Mendoza      Electronically Signed By: CAROLE Mendoza   4/26/23 11:46 AM

## 2023-04-26 PROBLEM — R53.83 LETHARGY: Status: ACTIVE | Noted: 2023-01-01

## 2023-04-26 PROBLEM — R00.0 TACHYCARDIA: Status: ACTIVE | Noted: 2023-01-01

## 2023-04-26 NOTE — PROGRESS NOTES
PROGRESS NOTE    Subjective   Chief complaint: Cindy Nova is a 89 y.o. female who is a long term care patient being seen and evaluated for altered mental status    HPI:  Called to room by pulmonologist who is at bedside and states patient found in bed tachycardic and unresponsive this morning.  Family was called and initially wanted patient sent to the hospital however patient began to respond to verbal and tactile stimuli after a few minutes and stated that she did not want to go to the hospital.  Daughter elected to honor patient's wishes and transportation was canceled.  Patient is lethargic.  She denies nausea vomiting fever chills.      Objective   Vital signs: 125/79, 20, 97.6, 114, 95%    Physical Exam  Cardiovascular:      Rate and Rhythm: Tachycardia present. Rhythm irregular.   Pulmonary:      Effort: Pulmonary effort is normal.      Breath sounds: Decreased breath sounds present.      Comments: O2  Abdominal:      General: Bowel sounds are normal.      Palpations: Abdomen is soft.   Musculoskeletal:      Cervical back: Neck supple.      Right lower leg: No edema.      Left lower leg: No edema.      Comments: Generalized weakness   Neurological:      Mental Status: She is lethargic.   Psychiatric:         Behavior: Behavior is cooperative.         Assessment/Plan   Problem List Items Addressed This Visit       Chronic diastolic heart failure (CMS/HCC)     Diuretic  Stable  No edema  Monitor weight         Chronic respiratory failure (CMS/HCC)     Continue O2  Pulmonology following          COPD (chronic obstructive pulmonary disease) (CMS/HCC)     Continue inhalers  O2         Hypertension, essential     BP at goal  Continue antihypertensives  Monitor blood pressure         Lethargy - Primary     Decrease xanax  No hospital per patient and family         Tachycardia     Metoprolol   Monitor         Weakness     Medications, treatments, and labs reviewed  Continue medications and treatments as  listed in PCC    Deya Yadav, APRN-CNP

## 2023-04-27 NOTE — ASSESSMENT & PLAN NOTE
The wound has declined  We will change treatment to cleanse with NS, pat dry, apply Santyl calcium alginate and silicon border foam dressing daily  Nutritional supplement  Air mattress  Offload pressure  Cushion to chair  Turn q2h when in bed

## 2023-04-27 NOTE — LETTER
Patient: Cindy Nova  : 1922    Encounter Date: 2023    PROGRESS NOTE    Subjective  Chief complaint: Cindy Nova is a 89 y.o. female who is a long term care patient being seen and evaluated for follow-up wound and unresponsive episode  HPI:  Patient with wound to coccyx presents for follow-up.  She endorses tenderness to the area and is trying to stay off of it.  No foul odor or purulent drainage noted by nursing staff.  Patient had unresponsive episode a few days ago while pulmonologist was examining her.  Patient refused hospitalization at that time.  She has since returned to baseline mentation.  She remains on supplemental oxygen.  Denies shortness of breath at this time.      Objective  Vital signs: 132/66, 20, 97.9, 95%, 98    Physical Exam  Constitutional:       General: She is not in acute distress.  Eyes:      Extraocular Movements: Extraocular movements intact.   Pulmonary:      Effort: Pulmonary effort is normal.      Breath sounds: Normal breath sounds.      Comments: O2  Abdominal:      General: Bowel sounds are normal.      Palpations: Abdomen is soft.   Musculoskeletal:      Cervical back: Neck supple.      Right lower leg: No edema.      Left lower leg: No edema.      Comments: Generalized weakness   Skin:     Comments: Wound to coccyx:  Etiology - pressure  Granulation - small  Slough - large  Edges -flat, periwound reddened and poorly blanching  Odor - no  Drainage -medium serosanguinous  Size - 3 x 2 x utd cm   Neurological:      Mental Status: She is alert.   Psychiatric:         Mood and Affect: Mood normal.         Behavior: Behavior is cooperative.         Assessment/Plan  Problem List Items Addressed This Visit       Chronic diastolic heart failure (CMS/[facility])     Diuretic  Stable  No edema  Monitor weight         Chronic respiratory failure (CMS/[facility])     Continue O2  Pulmonology following          COPD (chronic obstructive pulmonary disease) (CMS/[facility])     Continue  inhalers  O2         Hypertension, essential     BP at goal  Continue antihypertensives  Monitor blood pressure         Pressure ulcer of coccygeal region, unstageable (CMS/[facility]) - Primary     The wound has declined  We will change treatment to cleanse with NS, pat dry, apply Santyl calcium alginate and silicon border foam dressing daily  Nutritional supplement  Air mattress  Offload pressure  Cushion to chair  Turn q2h when in bed          Medications, treatments, and labs reviewed  Continue medications and treatments as listed in PCC    CRAOLE Mendoza      Electronically Signed By: CAROLE Mendoza   4/27/23  3:58 PM

## 2023-04-27 NOTE — PROGRESS NOTES
PROGRESS NOTE    Subjective   Chief complaint: Cindy Nova is a 89 y.o. female who is a long term care patient being seen and evaluated for follow-up wound and unresponsive episode  HPI:  Patient with wound to coccyx presents for follow-up.  She endorses tenderness to the area and is trying to stay off of it.  No foul odor or purulent drainage noted by nursing staff.  Patient had unresponsive episode a few days ago while pulmonologist was examining her.  Patient refused hospitalization at that time.  She has since returned to baseline mentation.  She remains on supplemental oxygen.  Denies shortness of breath at this time.      Objective   Vital signs: 132/66, 20, 97.9, 95%, 98    Physical Exam  Constitutional:       General: She is not in acute distress.  Eyes:      Extraocular Movements: Extraocular movements intact.   Pulmonary:      Effort: Pulmonary effort is normal.      Breath sounds: Normal breath sounds.      Comments: O2  Abdominal:      General: Bowel sounds are normal.      Palpations: Abdomen is soft.   Musculoskeletal:      Cervical back: Neck supple.      Right lower leg: No edema.      Left lower leg: No edema.      Comments: Generalized weakness   Skin:     Comments: Wound to coccyx:  Etiology - pressure  Granulation - small  Slough - large  Edges -flat, periwound reddened and poorly blanching  Odor - no  Drainage -medium serosanguinous  Size - 3 x 2 x utd cm   Neurological:      Mental Status: She is alert.   Psychiatric:         Mood and Affect: Mood normal.         Behavior: Behavior is cooperative.         Assessment/Plan   Problem List Items Addressed This Visit       Chronic diastolic heart failure (CMS/HCC)     Diuretic  Stable  No edema  Monitor weight         Chronic respiratory failure (CMS/HCC)     Continue O2  Pulmonology following          COPD (chronic obstructive pulmonary disease) (CMS/[facility])     Continue inhalers  O2         Hypertension, essential     BP at goal  Continue  antihypertensives  Monitor blood pressure         Pressure ulcer of coccygeal region, unstageable (CMS/HCC) - Primary     The wound has declined  We will change treatment to cleanse with NS, pat dry, apply Santyl calcium alginate and silicon border foam dressing daily  Nutritional supplement  Air mattress  Offload pressure  Cushion to chair  Turn q2h when in bed          Medications, treatments, and labs reviewed  Continue medications and treatments as listed in PCC    Deya Yadav, APRN-CNP

## 2023-04-28 NOTE — LETTER
Patient: Cindy Nova  : 1922    Encounter Date: 2023    PROGRESS NOTE    Subjective  Chief complaint: Cindy Nova is a 89 y.o. female who is a long term care patient being seen and evaluated for lethargy    HPI:  Patient had an episode of lethargy and was unresponsive. She refused hospitalization and family agreed. The episode resolved and she is at baseline. Denies SOB or difficulty breathing. No new issues at this time. No acute distress.       Objective  Vital signs: 111/76, 95%    Physical Exam  Constitutional:       General: She is not in acute distress.  Eyes:      Extraocular Movements: Extraocular movements intact.   Cardiovascular:      Rate and Rhythm: Normal rate and regular rhythm.   Pulmonary:      Effort: Pulmonary effort is normal.      Breath sounds: Normal breath sounds.      Comments: O2  Aerosol Tx PRN  Abdominal:      General: Bowel sounds are normal.      Palpations: Abdomen is soft.   Musculoskeletal:      Cervical back: Neck supple.      Right lower leg: No edema.      Left lower leg: No edema.   Neurological:      Mental Status: She is alert.   Psychiatric:         Mood and Affect: Mood normal.         Behavior: Behavior is cooperative.         Assessment/Plan  Problem List Items Addressed This Visit          Respiratory    Acute on chronic respiratory failure with hypoxia and hypercapnia (CMS/HCC)     Felt to be 2/2 pleural effusion per hospital record  S/p thoracentesis            Circulatory    Chronic diastolic heart failure (CMS/HCC)     Diuretic  Stable  No edema  Monitor weight          Medications, treatments, and labs reviewed  Continue medications and treatments as listed in PCC    Scribe Attestation  By signing my name below, Delicia SANTO Scribe   attest that this documentation has been prepared under the direction and in the presence of Amy Heath MD.    Provider Attestation - Scribe documentation  All medical record entries made by the Scribe  were at my direction and personally dictated by me. I have reviewed the chart and agree that the record accurately reflects my personal performance of the history, physical exam, discussion and plan.    1. Chronic diastolic heart failure (CMS/HCC)        2. Acute on chronic respiratory failure with hypoxia and hypercapnia (CMS/HCC)               Electronically Signed By: Amy Heath MD   5/1/23  7:13 PM

## 2023-05-01 NOTE — PROGRESS NOTES
PROGRESS NOTE    Subjective   Chief complaint: Cindy Nova is a 89 y.o. female who is a long term care patient being seen and evaluated for lethargy    HPI:  Patient had an episode of lethargy and was unresponsive. She refused hospitalization and family agreed. The episode resolved and she is at baseline. Denies SOB or difficulty breathing. No new issues at this time. No acute distress.       Objective   Vital signs: 111/76, 95%    Physical Exam  Constitutional:       General: She is not in acute distress.  Eyes:      Extraocular Movements: Extraocular movements intact.   Cardiovascular:      Rate and Rhythm: Normal rate and regular rhythm.   Pulmonary:      Effort: Pulmonary effort is normal.      Breath sounds: Normal breath sounds.      Comments: O2  Aerosol Tx PRN  Abdominal:      General: Bowel sounds are normal.      Palpations: Abdomen is soft.   Musculoskeletal:      Cervical back: Neck supple.      Right lower leg: No edema.      Left lower leg: No edema.   Neurological:      Mental Status: She is alert.   Psychiatric:         Mood and Affect: Mood normal.         Behavior: Behavior is cooperative.         Assessment/Plan   Problem List Items Addressed This Visit          Respiratory    Acute on chronic respiratory failure with hypoxia and hypercapnia (CMS/HCC)     Felt to be 2/2 pleural effusion per hospital record  S/p thoracentesis            Circulatory    Chronic diastolic heart failure (CMS/HCC)     Diuretic  Stable  No edema  Monitor weight          Medications, treatments, and labs reviewed  Continue medications and treatments as listed in PCC    Scribe Attestation  By signing my name below, Delicia SANTO Scribe   attest that this documentation has been prepared under the direction and in the presence of Amy Heath MD.    Provider Attestation - Scribe documentation  All medical record entries made by the Scribe were at my direction and personally dictated by me. I have reviewed the  chart and agree that the record accurately reflects my personal performance of the history, physical exam, discussion and plan.    1. Chronic diastolic heart failure (CMS/HCC)        2. Acute on chronic respiratory failure with hypoxia and hypercapnia (CMS/HCC)

## 2023-05-04 NOTE — PROGRESS NOTES
PROGRESS NOTE    Subjective   Chief complaint: Cindy Nova is a 89 y.o. female who is a long term care patient being seen and evaluated for fu wounds and fu chronic respiratory failure    HPI:  Patient presents for follow-up wound and f/u chronic respiratory failure.  Patient remains on O2.  She denies shortness of breath cough fever and chills.  She does have a sacral ulcer which is tender to palpation.  Staff denies purulent drainage or foul odor from the wound.      Objective   Vital signs: 115/93, 18, 97.5, 106, 97%    Physical Exam  Constitutional:       General: She is not in acute distress.  Eyes:      Extraocular Movements: Extraocular movements intact.   Pulmonary:      Effort: Pulmonary effort is normal.      Breath sounds: Decreased breath sounds present.      Comments: O2  Abdominal:      General: Bowel sounds are normal.      Palpations: Abdomen is soft.   Musculoskeletal:      Cervical back: Neck supple.      Right lower leg: No edema.      Left lower leg: No edema.      Comments: Generalized weakness   Skin:     Comments: Wound to coccyx:  Etiology - pressure  Granulation - small  Slough - large  Edges -flat, periwound reddened and poorly blanching  Odor - no  Drainage -medium serosanguinous  Size - 2.5 x 3 x utd cm   Neurological:      Mental Status: She is alert.   Psychiatric:         Mood and Affect: Mood normal.         Behavior: Behavior is cooperative.     PROCEDURE: After obtaining verbal consent from the patient, I performed subcutaneous tissue debridement with a total area of 7.5 cm2 with curette to remove viable and non-viable tissue/material including subcutaneous tissue, slough, biofilm, and fibrin/exudate after achieving pain control with 2% lidocaine topical gel.  A minimal amount of bleeding was controlled with pressure. The procedure was tolerated well with a pain level of 0 throughout and a pain level of 0 following the procedure.  Post-debridement measurements unchanged.  Character of wound/ulcer post-debridement is improved.       Assessment/Plan   Problem List Items Addressed This Visit       Chronic diastolic heart failure (CMS/[facility])     Diuretic  Stable  No edema  Monitor weight  Obtain BMP in a.m.         Chronic respiratory failure (CMS/[facility])     Continue O2  Pulmonology following          COPD (chronic obstructive pulmonary disease) (CMS/[facility])     Continue inhalers  O2         Hypertension, essential     BP at goal  Continue antihypertensives  Monitor blood pressure         Pressure ulcer of coccygeal region, unstageable (CMS/[facility]) - Primary     Continue treatment to cleanse with NS, pat dry, apply Santyl calcium alginate and silicon border foam dressing daily  Nutritional supplement  Air mattress  Offload pressure  Cushion to chair  Turn q2h when in bed         Tachycardia     Will increase metoprolol   Continue to monitor          Medications, treatments, and labs reviewed  Continue medications and treatments as listed in PCC    MIGDALIA Mendoza-CNP

## 2023-05-04 NOTE — LETTER
Patient: Cindy Nova  : 1922    Encounter Date: 2023    PROGRESS NOTE    Subjective  Chief complaint: Cindy Nova is a 89 y.o. female who is a long term care patient being seen and evaluated for fu wounds and fu chronic respiratory failure    HPI:  Patient presents for follow-up wound and f/u chronic respiratory failure.  Patient remains on O2.  She denies shortness of breath cough fever and chills.  She does have a sacral ulcer which is tender to palpation.  Staff denies purulent drainage or foul odor from the wound.      Objective  Vital signs: 115/93, 18, 97.5, 106, 97%    Physical Exam  Constitutional:       General: She is not in acute distress.  Eyes:      Extraocular Movements: Extraocular movements intact.   Pulmonary:      Effort: Pulmonary effort is normal.      Breath sounds: Decreased breath sounds present.      Comments: O2  Abdominal:      General: Bowel sounds are normal.      Palpations: Abdomen is soft.   Musculoskeletal:      Cervical back: Neck supple.      Right lower leg: No edema.      Left lower leg: No edema.      Comments: Generalized weakness   Skin:     Comments: Wound to coccyx:  Etiology - pressure  Granulation - small  Slough - large  Edges -flat, periwound reddened and poorly blanching  Odor - no  Drainage -medium serosanguinous  Size - 2.5 x 3 x utd cm   Neurological:      Mental Status: She is alert.   Psychiatric:         Mood and Affect: Mood normal.         Behavior: Behavior is cooperative.     PROCEDURE: After obtaining verbal consent from the patient, I performed subcutaneous tissue debridement with a total area of 7.5 cm2 with curette to remove viable and non-viable tissue/material including subcutaneous tissue, slough, biofilm, and fibrin/exudate after achieving pain control with 2% lidocaine topical gel.  A minimal amount of bleeding was controlled with pressure. The procedure was tolerated well with a pain level of 0 throughout and a pain level of 0  following the procedure.  Post-debridement measurements unchanged. Character of wound/ulcer post-debridement is improved.       Assessment/Plan  Problem List Items Addressed This Visit       Chronic diastolic heart failure (CMS/[facility])     Diuretic  Stable  No edema  Monitor weight  Obtain BMP in a.m.         Chronic respiratory failure (CMS/HCC)     Continue O2  Pulmonology following          COPD (chronic obstructive pulmonary disease) (CMS/[facility])     Continue inhalers  O2         Hypertension, essential     BP at goal  Continue antihypertensives  Monitor blood pressure         Pressure ulcer of coccygeal region, unstageable (CMS/[facility]) - Primary     Continue treatment to cleanse with NS, pat dry, apply Santyl calcium alginate and silicon border foam dressing daily  Nutritional supplement  Air mattress  Offload pressure  Cushion to chair  Turn q2h when in bed         Tachycardia     Will increase metoprolol   Continue to monitor          Medications, treatments, and labs reviewed  Continue medications and treatments as listed in [facility]    CAROLE Mendoza    Electronically Signed By: CAROLE Mendoza   5/4/23  3:50 PM

## 2023-05-04 NOTE — ASSESSMENT & PLAN NOTE
Continue treatment to cleanse with NS, pat dry, apply Santyl calcium alginate and silicon border foam dressing daily  Nutritional supplement  Air mattress  Offload pressure  Cushion to chair  Turn q2h when in bed

## 2023-05-05 NOTE — LETTER
Patient: Cindy Nova  : 1922    Encounter Date: 2023    PROGRESS NOTE    Subjective  Chief complaint: Cindy Nova is a 89 y.o. female who is a long term care patient being seen and evaluated for monthly general medical care and follow-up.    HPI:    Patient presents for general medical care and f/u.  Patient seen and examined at bedside.  No issues per nursing.  Patient has no acute complaints.  CHF, denies SOB, orthopnea or weight gain. Anxiety controlled, Denies feeling anxious nervous or afraid. HTN, Denies headache or chest pain. No acute distress.       Objective  Vital signs: 132/73, 98%    Physical Exam  Constitutional:       General: She is not in acute distress.  Eyes:      Extraocular Movements: Extraocular movements intact.   Cardiovascular:      Rate and Rhythm: Normal rate and regular rhythm.   Pulmonary:      Effort: Pulmonary effort is normal.      Breath sounds: Normal breath sounds.   Abdominal:      General: Bowel sounds are normal.      Palpations: Abdomen is soft.   Musculoskeletal:      Cervical back: Neck supple.      Right lower leg: No edema.      Left lower leg: No edema.   Neurological:      Mental Status: She is alert.   Psychiatric:         Mood and Affect: Mood normal.         Behavior: Behavior is cooperative.         Assessment/Plan  Problem List Items Addressed This Visit          Circulatory    Hypertension, essential     BP at goal  Continue antihypertensives  Monitor blood pressure         Chronic diastolic heart failure (CMS/HCC)     Diuretic  Stable  No edema  Monitor weight  Obtain BMP in a.m.            Other    Anxiety     Controlled  buspar          Medications, treatments, and labs reviewed  Continue medications and treatments as listed in [facility]    Scribe Attestation  By signing my name below, IDelicia, Scribe   attest that this documentation has been prepared under the direction and in the presence of Amy Heath MD.    Provider  Attestation - Scribe documentation  All medical record entries made by the Scribe were at my direction and personally dictated by me. I have reviewed the chart and agree that the record accurately reflects my personal performance of the history, physical exam, discussion and plan.    1. Chronic diastolic heart failure (CMS/HCC)        2. Hypertension, essential        3. Anxiety               Electronically Signed By: Amy Heath MD   5/8/23  4:04 PM

## 2023-05-08 NOTE — PROGRESS NOTES
PROGRESS NOTE    Subjective   Chief complaint: Cindy Nova is a 89 y.o. female who is a long term care patient being seen and evaluated for monthly general medical care and follow-up.    HPI:    Patient presents for general medical care and f/u.  Patient seen and examined at bedside.  No issues per nursing.  Patient has no acute complaints.  CHF, denies SOB, orthopnea or weight gain. Anxiety controlled, Denies feeling anxious nervous or afraid. HTN, Denies headache or chest pain. No acute distress.       Objective   Vital signs: 132/73, 98%    Physical Exam  Constitutional:       General: She is not in acute distress.  Eyes:      Extraocular Movements: Extraocular movements intact.   Cardiovascular:      Rate and Rhythm: Normal rate and regular rhythm.   Pulmonary:      Effort: Pulmonary effort is normal.      Breath sounds: Normal breath sounds.   Abdominal:      General: Bowel sounds are normal.      Palpations: Abdomen is soft.   Musculoskeletal:      Cervical back: Neck supple.      Right lower leg: No edema.      Left lower leg: No edema.   Neurological:      Mental Status: She is alert.   Psychiatric:         Mood and Affect: Mood normal.         Behavior: Behavior is cooperative.         Assessment/Plan   Problem List Items Addressed This Visit          Circulatory    Hypertension, essential     BP at goal  Continue antihypertensives  Monitor blood pressure         Chronic diastolic heart failure (CMS/HCC)     Diuretic  Stable  No edema  Monitor weight  Obtain BMP in a.m.            Other    Anxiety     Controlled  buspar          Medications, treatments, and labs reviewed  Continue medications and treatments as listed in [facility]    Scribe Attestation  By signing my name below, Delicia SANTO Scribe   attest that this documentation has been prepared under the direction and in the presence of Amy Heath MD.    Provider Attestation - Scribe documentation  All medical record entries made by the Scribe  were at my direction and personally dictated by me. I have reviewed the chart and agree that the record accurately reflects my personal performance of the history, physical exam, discussion and plan.    1. Chronic diastolic heart failure (CMS/HCC)        2. Hypertension, essential        3. Anxiety

## 2023-05-10 NOTE — LETTER
Patient: Cindy Nova  : 1922    Encounter Date: 05/10/2023    PROGRESS NOTE    Subjective  Chief complaint: Cindy Nova is a 89 y.o. female who is a long term care patient being seen and evaluated for  tachycardia    HPI:   patient recently had an increase in her metoprolol instances of tachycardia.  Patient denies chest pain and headache.  No new issues or concerns.  No acute distress.      Objective  Vital signs:  126/74, 98%    Physical Exam  Constitutional:       General: She is not in acute distress.  Eyes:      Extraocular Movements: Extraocular movements intact.   Cardiovascular:      Rate and Rhythm: Normal rate and regular rhythm.   Pulmonary:      Effort: Pulmonary effort is normal.      Breath sounds: Normal breath sounds.   Abdominal:      General: Bowel sounds are normal.      Palpations: Abdomen is soft.   Musculoskeletal:      Cervical back: Neck supple.      Right lower leg: No edema.      Left lower leg: No edema.   Neurological:      Mental Status: She is alert.   Psychiatric:         Mood and Affect: Mood normal.         Behavior: Behavior is cooperative.         Assessment/Plan  Problem List Items Addressed This Visit          Circulatory    Tachycardia       Continue metoprolol  as ordered  Improved  Continue to monitor          Medications, treatments, and labs reviewed  Continue medications and treatments as listed in [facility]    Scribe Attestation  By signing my name below, IDelicia Scribe   attest that this documentation has been prepared under the direction and in the presence of Amy Heath MD.    Provider Attestation - Scribe documentation  All medical record entries made by the Scribe were at my direction and personally dictated by me. I have reviewed the chart and agree that the record accurately reflects my personal performance of the history, physical exam, discussion and plan.    1. Tachycardia               Electronically Signed By: Amy Heath MD    5/15/23  3:10 PM

## 2023-05-11 NOTE — LETTER
Patient: Cindy Nova  : 1922    Encounter Date: 2023    PROGRESS NOTE    Subjective  Chief complaint: Cindy Nova is a 89 y.o. female who is a long term care patient being seen and evaluated for follow-up wound    HPI:  Patient with unstageable pressure ulcer to the coccyx presents for follow-up.  Nursing staff has not noted any purulent drainage or foul odor from the wound.  Patient has no new concerns today.  She denies nausea vomiting fever chills.      Objective  Vital signs: 120/40, 98.1    Physical Exam  Constitutional:       General: She is not in acute distress.  Eyes:      Extraocular Movements: Extraocular movements intact.   Pulmonary:      Effort: Pulmonary effort is normal.      Comments: O2  Musculoskeletal:      Cervical back: Neck supple.      Right lower leg: No edema.      Left lower leg: No edema.      Comments: Generalized weakness   Skin:     Comments: Wound to coccyx:  Etiology - pressure  Granulation - small  Slough - large  Edges -flat, periwound reddened and poorly blanching  Odor - no  Drainage -medium serosanguinous  Size - 2.5 x 3.5 x utd cm   Neurological:      Mental Status: She is alert.   Psychiatric:         Mood and Affect: Mood normal.         Behavior: Behavior is cooperative.         Assessment/Plan  Problem List Items Addressed This Visit       Hypertension, essential     BP at goal  Continue antihypertensives  Monitor blood pressure         Pressure ulcer of coccygeal region, unstageable (CMS/HCC) - Primary     Continue treatment to cleanse with NS, pat dry, apply Santyl calcium alginate and silicon border foam dressing daily  Nutritional supplement  Air mattress  Offload pressure  Cushion to chair  Turn q2h when in bed          Medications, treatments, and labs reviewed  Continue medications and treatments as listed in [facility]    CAROLE Mendoza      Electronically Signed By: CAROLE Mendoza   23  4:27 PM

## 2023-05-11 NOTE — PROGRESS NOTES
PROGRESS NOTE    Subjective   Chief complaint: Cindy Nova is a 89 y.o. female who is a long term care patient being seen and evaluated for follow-up wound    HPI:  Patient with unstageable pressure ulcer to the coccyx presents for follow-up.  Nursing staff has not noted any purulent drainage or foul odor from the wound.  Patient has no new concerns today.  She denies nausea vomiting fever chills.      Objective   Vital signs: 120/40, 98.1    Physical Exam  Constitutional:       General: She is not in acute distress.  Eyes:      Extraocular Movements: Extraocular movements intact.   Pulmonary:      Effort: Pulmonary effort is normal.      Comments: O2  Musculoskeletal:      Cervical back: Neck supple.      Right lower leg: No edema.      Left lower leg: No edema.      Comments: Generalized weakness   Skin:     Comments: Wound to coccyx:  Etiology - pressure  Granulation - small  Slough - large  Edges -flat, periwound reddened and poorly blanching  Odor - no  Drainage -medium serosanguinous  Size - 2.5 x 3.5 x utd cm   Neurological:      Mental Status: She is alert.   Psychiatric:         Mood and Affect: Mood normal.         Behavior: Behavior is cooperative.         Assessment/Plan   Problem List Items Addressed This Visit       Hypertension, essential     BP at goal  Continue antihypertensives  Monitor blood pressure         Pressure ulcer of coccygeal region, unstageable (CMS/HCC) - Primary     Continue treatment to cleanse with NS, pat dry, apply Santyl calcium alginate and silicon border foam dressing daily  Nutritional supplement  Air mattress  Offload pressure  Cushion to chair  Turn q2h when in bed          Medications, treatments, and labs reviewed  Continue medications and treatments as listed in [facility]    Deya Yadav, APRN-CNP

## 2023-05-15 PROBLEM — M79.605 PAIN IN BOTH LOWER EXTREMITIES: Status: ACTIVE | Noted: 2023-01-01

## 2023-05-15 PROBLEM — M79.604 PAIN IN BOTH LOWER EXTREMITIES: Status: ACTIVE | Noted: 2023-01-01

## 2023-05-15 NOTE — PROGRESS NOTES
PROGRESS NOTE    Subjective   Chief complaint: Cindy Nova is a 89 y.o. female who is a long term care patient being seen and evaluated for leg pain    HPI:  Nurse reporting patient with complaint of right lower extremity pain with movement.  Patient seen and examined at bedside sitting up at edge of bed.  She denies pain at this time.  She states that she does have pain in her legs almost daily which comes and goes.  She is unable to describe the pain.      Objective   Vital signs: 136/68, 97.9, 76, 18, 94%    Physical Exam  Constitutional:       General: She is not in acute distress.  Eyes:      Extraocular Movements: Extraocular movements intact.   Pulmonary:      Effort: Pulmonary effort is normal.   Musculoskeletal:      Cervical back: Neck supple.      Comments: Bilateral lower extremities no discoloration, good range of motion, no swelling, nontender   Neurological:      Mental Status: She is alert.   Psychiatric:         Mood and Affect: Mood normal.         Behavior: Behavior is cooperative.         Assessment/Plan   Problem List Items Addressed This Visit       Chronic respiratory failure (CMS/HCC)     Stable  Continue O2  Pulmonology following          Hypertension, essential     BP at goal  Continue antihypertensives  Monitor blood pressure         Pain in both lower extremities - Primary     No pain presently  We will start Biofreeze as needed          Medications, treatments, and labs reviewed  Continue medications and treatments as listed in [facility]    MIGDALIA Mendoza-CNP

## 2023-05-15 NOTE — LETTER
Patient: Cindy Nova  : 1922    Encounter Date: 05/15/2023    PROGRESS NOTE    Subjective  Chief complaint: Cindy Nova is a 89 y.o. female who is a long term care patient being seen and evaluated for leg pain    HPI:  Nurse reporting patient with complaint of right lower extremity pain with movement.  Patient seen and examined at bedside sitting up at edge of bed.  She denies pain at this time.  She states that she does have pain in her legs almost daily which comes and goes.  She is unable to describe the pain.      Objective  Vital signs: 136/68, 97.9, 76, 18, 94%    Physical Exam  Constitutional:       General: She is not in acute distress.  Eyes:      Extraocular Movements: Extraocular movements intact.   Pulmonary:      Effort: Pulmonary effort is normal.   Musculoskeletal:      Cervical back: Neck supple.      Comments: Bilateral lower extremities no discoloration, good range of motion, no swelling, nontender   Neurological:      Mental Status: She is alert.   Psychiatric:         Mood and Affect: Mood normal.         Behavior: Behavior is cooperative.         Assessment/Plan  Problem List Items Addressed This Visit       Chronic respiratory failure (CMS/HCC)     Stable  Continue O2  Pulmonology following          Hypertension, essential     BP at goal  Continue antihypertensives  Monitor blood pressure         Pain in both lower extremities - Primary     No pain presently  We will start Biofreeze as needed          Medications, treatments, and labs reviewed  Continue medications and treatments as listed in [facility]    CAROLE Mendoza      Electronically Signed By: CAROLE Mendoza   5/15/23  4:58 PM

## 2023-05-15 NOTE — PROGRESS NOTES
PROGRESS NOTE    Subjective   Chief complaint: Cindy Nova is a 89 y.o. female who is a long term care patient being seen and evaluated for  tachycardia    HPI:   patient recently had an increase in her metoprolol instances of tachycardia.  Patient denies chest pain and headache.  No new issues or concerns.  No acute distress.      Objective   Vital signs:  126/74, 98%    Physical Exam  Constitutional:       General: She is not in acute distress.  Eyes:      Extraocular Movements: Extraocular movements intact.   Cardiovascular:      Rate and Rhythm: Normal rate and regular rhythm.   Pulmonary:      Effort: Pulmonary effort is normal.      Breath sounds: Normal breath sounds.   Abdominal:      General: Bowel sounds are normal.      Palpations: Abdomen is soft.   Musculoskeletal:      Cervical back: Neck supple.      Right lower leg: No edema.      Left lower leg: No edema.   Neurological:      Mental Status: She is alert.   Psychiatric:         Mood and Affect: Mood normal.         Behavior: Behavior is cooperative.         Assessment/Plan   Problem List Items Addressed This Visit          Circulatory    Tachycardia       Continue metoprolol  as ordered  Improved  Continue to monitor          Medications, treatments, and labs reviewed  Continue medications and treatments as listed in [facility]    Scribe Attestation  By signing my name below, IDelicia Scribe   attest that this documentation has been prepared under the direction and in the presence of Amy Heath MD.    Provider Attestation - Scribe documentation  All medical record entries made by the Scribe were at my direction and personally dictated by me. I have reviewed the chart and agree that the record accurately reflects my personal performance of the history, physical exam, discussion and plan.    1. Tachycardia

## 2023-05-26 NOTE — PROGRESS NOTES
HISTORY & PHYSICAL    Subjective   Chief complaint: Cindy Nova is a 89 y.o. female who is a acute skilled care patient being seen and evaluated for multiple medical problems.  Patient presents for weakness.    HPI:  Patient presented to the ED after an unwitnessed fall at nursing facility. Pt states she fell off bed. Pt reported some hip pain. Patient was combative in ED and removed IV. She was given more IV fluids and antibiotics For healthcare acquired pneumonia she was given oxygen aerosol treatment. Patient was stable upon discharge.        Past Medical History:   Diagnosis Date    Acute respiratory failure with hypoxia (CMS/[facility])     Anemia     Anxiety     Chronic heart failure (CMS/HCC)     Chronic respiratory failure (CMS/HCC)     COPD (chronic obstructive pulmonary disease) (CMS/[facility])     Hypertension, essential     Other allergy status, other than to drugs and biological substances     History of environmental allergies    Personal history of other diseases of the circulatory system     History of hypertension    Personal history of other diseases of the circulatory system     History of atrial fibrillation    Personal history of peptic ulcer disease 03/10/2015    Personal history of gastric ulcer    Polyneuropathy     Primary pulmonary HTN (CMS/[facility])        Past Surgical History:   Procedure Laterality Date    CATARACT EXTRACTION  11/13/2014    Cataract Surgery    TOTAL ABDOMINAL HYSTERECTOMY  11/13/2014    Total Abdominal Hysterectomy       Family History   Problem Relation Name Age of Onset    No Known Problems Mother      No Known Problems Father         Social History     Socioeconomic History    Marital status:      Spouse name: Not on file    Number of children: Not on file    Years of education: Not on file    Highest education level: Not on file   Occupational History    Not on file   Tobacco Use    Smoking status: Not on file    Smokeless tobacco: Not on file   Vaping Use    Vaping  status: Not on file   Substance and Sexual Activity    Alcohol use: Not on file    Drug use: Not on file    Sexual activity: Not on file   Other Topics Concern    Not on file   Social History Narrative    Not on file     Social Determinants of Health     Financial Resource Strain: Not on file   Food Insecurity: Not on file   Transportation Needs: Not on file   Physical Activity: Not on file   Stress: Not on file   Social Connections: Not on file   Intimate Partner Violence: Not on file   Housing Stability: Not on file       Vital signs: 101/60, 98.1, 86, 18, 94%    Objective   Physical Exam  Vitals reviewed.   Constitutional:       Appearance: Normal appearance.   HENT:      Head: Normocephalic and atraumatic.   Cardiovascular:      Rate and Rhythm: Normal rate and regular rhythm.   Pulmonary:      Effort: Pulmonary effort is normal.      Breath sounds: Normal breath sounds.   Abdominal:      General: Bowel sounds are normal.      Palpations: Abdomen is soft.   Musculoskeletal:      Cervical back: Neck supple.   Skin:     General: Skin is warm and dry.   Neurological:      General: No focal deficit present.      Mental Status: She is alert.   Psychiatric:         Mood and Affect: Mood normal.         Behavior: Behavior is cooperative.         Assessment/Plan   Problem List Items Addressed This Visit          Respiratory    COPD (chronic obstructive pulmonary disease) (CMS/HCC)     Continue inhalers  O2         Acute on chronic respiratory failure with hypoxia and hypercapnia (CMS/HCC) - Primary     Felt to be 2/2 pleural effusion per hospital record  S/p thoracentesis            Circulatory    Hypertension, essential     BP at goal  Continue antihypertensives  Monitor blood pressure         Chronic diastolic heart failure (CMS/HCC)     Diuretic  Stable  No edema  Monitor weight  Obtain BMP in a.m.            Other    Weakness     PT OT          Medications, treatments, and labs reviewed  Continue medications and  treatments as listed in [facility]    Scribe Attestation  I, Toña Limon   attest that this documentation has been prepared under the direction and in the presence of Amy Heath MD.    Provider Attestation - Scribe documentation  All medical record entries made by the Scribe were at my direction and personally dictated by me. I have reviewed the chart and agree that the record accurately reflects my personal performance of the history, physical exam, discussion and plan.    Amy Heath MD

## 2023-05-26 NOTE — LETTER
Patient: Cindy Nova  : 1922    Encounter Date: 2023    HISTORY & PHYSICAL    Subjective  Chief complaint: Cindy Nova is a 89 y.o. female who is a acute skilled care patient being seen and evaluated for multiple medical problems.  Patient presents for weakness.    HPI:  Patient presented to the ED after an unwitnessed fall at nursing facility. Pt states she fell off bed. Pt reported some hip pain. Patient was combative in ED and removed IV. She was given more IV fluids and antibiotics For healthcare acquired pneumonia she was given oxygen aerosol treatment. Patient was stable upon discharge.        Past Medical History:   Diagnosis Date   • Acute respiratory failure with hypoxia (CMS/HCC)    • Anemia    • Anxiety    • Chronic heart failure (CMS/HCC)    • Chronic respiratory failure (CMS/HCC)    • COPD (chronic obstructive pulmonary disease) (CMS/HCC)    • Hypertension, essential    • Other allergy status, other than to drugs and biological substances     History of environmental allergies   • Personal history of other diseases of the circulatory system     History of hypertension   • Personal history of other diseases of the circulatory system     History of atrial fibrillation   • Personal history of peptic ulcer disease 03/10/2015    Personal history of gastric ulcer   • Polyneuropathy    • Primary pulmonary HTN (CMS/HCC)        Past Surgical History:   Procedure Laterality Date   • CATARACT EXTRACTION  2014    Cataract Surgery   • TOTAL ABDOMINAL HYSTERECTOMY  2014    Total Abdominal Hysterectomy       Family History   Problem Relation Name Age of Onset   • No Known Problems Mother     • No Known Problems Father         Social History     Socioeconomic History   • Marital status:      Spouse name: Not on file   • Number of children: Not on file   • Years of education: Not on file   • Highest education level: Not on file   Occupational History   • Not on file   Tobacco  Use   • Smoking status: Not on file   • Smokeless tobacco: Not on file   Vaping Use   • Vaping status: Not on file   Substance and Sexual Activity   • Alcohol use: Not on file   • Drug use: Not on file   • Sexual activity: Not on file   Other Topics Concern   • Not on file   Social History Narrative   • Not on file     Social Determinants of Health     Financial Resource Strain: Not on file   Food Insecurity: Not on file   Transportation Needs: Not on file   Physical Activity: Not on file   Stress: Not on file   Social Connections: Not on file   Intimate Partner Violence: Not on file   Housing Stability: Not on file       Vital signs: 101/60, 98.1, 86, 18, 94%    Objective  Physical Exam  Vitals reviewed.   Constitutional:       Appearance: Normal appearance.   HENT:      Head: Normocephalic and atraumatic.   Cardiovascular:      Rate and Rhythm: Normal rate and regular rhythm.   Pulmonary:      Effort: Pulmonary effort is normal.      Breath sounds: Normal breath sounds.   Abdominal:      General: Bowel sounds are normal.      Palpations: Abdomen is soft.   Musculoskeletal:      Cervical back: Neck supple.   Skin:     General: Skin is warm and dry.   Neurological:      General: No focal deficit present.      Mental Status: She is alert.   Psychiatric:         Mood and Affect: Mood normal.         Behavior: Behavior is cooperative.         Assessment/Plan  Problem List Items Addressed This Visit          Respiratory    COPD (chronic obstructive pulmonary disease) (CMS/HCC)     Continue inhalers  O2         Acute on chronic respiratory failure with hypoxia and hypercapnia (CMS/HCC) - Primary     Felt to be 2/2 pleural effusion per hospital record  S/p thoracentesis            Circulatory    Hypertension, essential     BP at goal  Continue antihypertensives  Monitor blood pressure         Chronic diastolic heart failure (CMS/HCC)     Diuretic  Stable  No edema  Monitor weight  Obtain BMP in a.m.            Other     Weakness     PT OT          Medications, treatments, and labs reviewed  Continue medications and treatments as listed in PCC    Scribe Attestation  I, Toña Limon   attest that this documentation has been prepared under the direction and in the presence of Amy Heath MD.    Provider Attestation - Scribe documentation  All medical record entries made by the Scribe were at my direction and personally dictated by me. I have reviewed the chart and agree that the record accurately reflects my personal performance of the history, physical exam, discussion and plan.    Amy Heath MD          Electronically Signed By: Amy Heath MD   5/28/23  1:09 PM

## 2023-06-28 NOTE — PROGRESS NOTES
PROGRESS NOTE    Subjective   Chief complaint: Cindy Nova is a 89 y.o. female who is a long term care patient being seen and evaluated for follow-up wound       HPI:  Patient has unstageable pressure ulcer to the coccyx.   she denies pain associated.Patient has no new concerns today.  She denies nausea vomiting fever chills.   Denies shortness of breath      Objective   Vital signs: 120/40, 98.1    Physical Exam  Constitutional:       General: She is not in acute distress.  Eyes:      Extraocular Movements: Extraocular movements intact.   Pulmonary:      Effort: Pulmonary effort is normal.      Comments: O2  Musculoskeletal:      Cervical back: Neck supple.      Right lower leg: No edema.      Left lower leg: No edema.      Comments: Generalized weakness   Skin:     Comments: Wound to coccyx:  Etiology - pressure  Granulation - small  Slough - large  Edges -flat, periwound reddened and poorly blanching  Odor - no  Drainage -medium serosanguinous  Size - 2.5 x 3.5 x utd cm   Neurological:      Mental Status: She is alert.   Psychiatric:         Mood and Affect: Mood normal.         Behavior: Behavior is cooperative.         Assessment/Plan   Problem List Items Addressed This Visit          Cardiac and Vasculature    Chronic diastolic heart failure (CMS/HCC)     Diuretic  Stable  No edema  Monitor weight  Obtain BMP in a.m.            Musculoskeletal and Injuries    Pressure ulcer of coccygeal region, unstageable (CMS/HCC)     Continue treatment to cleanse with NS, pat dry, apply Santyl calcium alginate and silicon border foam dressing daily  Nutritional supplement  Air mattress  Offload pressure  Cushion to chair  Turn q2h when in bed        Medications, treatments, and labs reviewed  Continue medications and treatments as listed in PCC    Amy Heath MD